# Patient Record
Sex: FEMALE | Race: WHITE | Employment: FULL TIME | URBAN - METROPOLITAN AREA
[De-identification: names, ages, dates, MRNs, and addresses within clinical notes are randomized per-mention and may not be internally consistent; named-entity substitution may affect disease eponyms.]

---

## 2017-01-04 ENCOUNTER — TELEPHONE (OUTPATIENT)
Dept: SURGERY | Age: 57
End: 2017-01-04

## 2017-01-04 NOTE — TELEPHONE ENCOUNTER
Patient would like to know if Dr Teri Lizarraga is on board for the dual surgery with Dr Kike Mcfarlane. She has an appointment with Dr Teri Lizarraga tomorrow.

## 2017-01-05 ENCOUNTER — HOSPITAL ENCOUNTER (OUTPATIENT)
Dept: GENERAL RADIOLOGY | Age: 57
Discharge: HOME | End: 2017-01-05
Attending: SURGERY
Payer: COMMERCIAL

## 2017-01-05 ENCOUNTER — HOSPITAL ENCOUNTER (OUTPATIENT)
Dept: PREADMISSION TESTING | Age: 57
Discharge: HOME | End: 2017-01-05
Attending: PLASTIC SURGERY
Payer: COMMERCIAL

## 2017-01-05 VITALS
TEMPERATURE: 97.7 F | DIASTOLIC BLOOD PRESSURE: 87 MMHG | OXYGEN SATURATION: 97 % | RESPIRATION RATE: 20 BRPM | HEIGHT: 66 IN | BODY MASS INDEX: 31.32 KG/M2 | HEART RATE: 78 BPM | SYSTOLIC BLOOD PRESSURE: 156 MMHG | WEIGHT: 194.89 LBS

## 2017-01-05 LAB
ALBUMIN SERPL BCP-MCNC: 3.5 G/DL (ref 3.5–5)
ALBUMIN/GLOB SERPL: 1 {RATIO} (ref 1.1–2.2)
ALP SERPL-CCNC: 80 U/L (ref 45–117)
ALT SERPL-CCNC: 57 U/L (ref 12–78)
ANION GAP BLD CALC-SCNC: 5 MMOL/L (ref 5–15)
APPEARANCE UR: CLEAR
AST SERPL W P-5'-P-CCNC: 27 U/L (ref 15–37)
ATRIAL RATE: 75 BPM
BACTERIA URNS QL MICRO: ABNORMAL /HPF
BILIRUB SERPL-MCNC: 0.5 MG/DL (ref 0.2–1)
BILIRUB UR QL: NEGATIVE
BUN SERPL-MCNC: 12 MG/DL (ref 6–20)
BUN/CREAT SERPL: 13 (ref 12–20)
CALCIUM SERPL-MCNC: 8.2 MG/DL (ref 8.5–10.1)
CALCULATED P AXIS, ECG09: 24 DEGREES
CALCULATED R AXIS, ECG10: 0 DEGREES
CALCULATED T AXIS, ECG11: 19 DEGREES
CHLORIDE SERPL-SCNC: 108 MMOL/L (ref 97–108)
CO2 SERPL-SCNC: 29 MMOL/L (ref 21–32)
COLOR UR: ABNORMAL
CREAT SERPL-MCNC: 0.89 MG/DL (ref 0.55–1.02)
DIAGNOSIS, 93000: NORMAL
EPITH CASTS URNS QL MICRO: ABNORMAL /LPF
ERYTHROCYTE [DISTWIDTH] IN BLOOD BY AUTOMATED COUNT: 13.8 % (ref 11.5–14.5)
GLOBULIN SER CALC-MCNC: 3.6 G/DL (ref 2–4)
GLUCOSE SERPL-MCNC: 92 MG/DL (ref 65–100)
GLUCOSE UR STRIP.AUTO-MCNC: NEGATIVE MG/DL
HCT VFR BLD AUTO: 41.2 % (ref 35–47)
HGB BLD-MCNC: 13.6 G/DL (ref 11.5–16)
HGB UR QL STRIP: ABNORMAL
KETONES UR QL STRIP.AUTO: NEGATIVE MG/DL
LEUKOCYTE ESTERASE UR QL STRIP.AUTO: NEGATIVE
MCH RBC QN AUTO: 28.8 PG (ref 26–34)
MCHC RBC AUTO-ENTMCNC: 33 G/DL (ref 30–36.5)
MCV RBC AUTO: 87.1 FL (ref 80–99)
NITRITE UR QL STRIP.AUTO: NEGATIVE
P-R INTERVAL, ECG05: 160 MS
PH UR STRIP: 6 [PH] (ref 5–8)
PLATELET # BLD AUTO: 257 K/UL (ref 150–400)
POTASSIUM SERPL-SCNC: 3.8 MMOL/L (ref 3.5–5.1)
PROT SERPL-MCNC: 7.1 G/DL (ref 6.4–8.2)
PROT UR STRIP-MCNC: NEGATIVE MG/DL
Q-T INTERVAL, ECG07: 380 MS
QRS DURATION, ECG06: 78 MS
QTC CALCULATION (BEZET), ECG08: 424 MS
RBC # BLD AUTO: 4.73 M/UL (ref 3.8–5.2)
RBC #/AREA URNS HPF: ABNORMAL /HPF (ref 0–5)
SODIUM SERPL-SCNC: 142 MMOL/L (ref 136–145)
SP GR UR REFRACTOMETRY: 1.02 (ref 1–1.03)
UA: UC IF INDICATED,UAUC: ABNORMAL
UROBILINOGEN UR QL STRIP.AUTO: 0.2 EU/DL (ref 0.2–1)
VENTRICULAR RATE, ECG03: 75 BPM
WBC # BLD AUTO: 6.1 K/UL (ref 3.6–11)
WBC URNS QL MICRO: ABNORMAL /HPF (ref 0–4)

## 2017-01-05 PROCEDURE — 71020 XR CHEST PA LAT: CPT

## 2017-01-05 PROCEDURE — 85027 COMPLETE CBC AUTOMATED: CPT | Performed by: SURGERY

## 2017-01-05 PROCEDURE — 80053 COMPREHEN METABOLIC PANEL: CPT | Performed by: SURGERY

## 2017-01-05 PROCEDURE — 36415 COLL VENOUS BLD VENIPUNCTURE: CPT | Performed by: SURGERY

## 2017-01-05 PROCEDURE — 87086 URINE CULTURE/COLONY COUNT: CPT | Performed by: SURGERY

## 2017-01-05 PROCEDURE — 81001 URINALYSIS AUTO W/SCOPE: CPT | Performed by: SURGERY

## 2017-01-05 PROCEDURE — 93005 ELECTROCARDIOGRAM TRACING: CPT

## 2017-01-05 RX ORDER — SODIUM CHLORIDE, SODIUM LACTATE, POTASSIUM CHLORIDE, CALCIUM CHLORIDE 600; 310; 30; 20 MG/100ML; MG/100ML; MG/100ML; MG/100ML
25 INJECTION, SOLUTION INTRAVENOUS CONTINUOUS
Status: CANCELLED | OUTPATIENT
Start: 2017-01-13

## 2017-01-05 RX ORDER — CEFAZOLIN SODIUM IN 0.9 % NACL 2 G/100 ML
2 PLASTIC BAG, INJECTION (ML) INTRAVENOUS ONCE
Status: CANCELLED | OUTPATIENT
Start: 2017-01-13 | End: 2017-01-13

## 2017-01-05 NOTE — PERIOP NOTES
[facility]  Preoperative Instructions         Surgery Date 01/13/17          Time of Arrival 0630  Contact # [phone] cell    1. On the day of your surgery, please report to the Surgical Services Registration Desk and sign in at your designated time. The Surgery Center is located to the right of the Emergency Room. 2. You must have someone with you to drive you home. You should not drive a car for 24 hours following surgery. Please make arrangements for a friend or family member to stay with you for the first 24 hours after your surgery. 3. Do not have anything to eat or drink (including water, gum, mints, coffee, juice) after midnight          . This may not apply to medications prescribed by your physician. Please note special instructions, if applicable. If you are currently taking Plavix, Coumadin, or other blood-thinning agents, contact your surgeon for instructions. 4. We recommend you do not drink any alcoholic beverages for 24 hours before and after your surgery. 5. Have a list of all current medications, including vitamins, herbal supplements and any other over the counter medications. Stop all Aspirin and non-steroidal anti-inflammatory drugs (I.e. Advil, Aleve), as directed by your surgeon's office. Stop all vitamins and herbal supplements seven days prior to your surgery. 6. Wear comfortable clothes. Wear glasses instead of contacts. Do not bring any money or jewelry. Please bring picture ID, insurance card, and any prearranged co-payment or hospital payment. Do not wear make-up, particularly mascara the morning of your surgery. Do not wear nail polish, particularly if you are having foot /hand surgery. Wear your hair loose or down, no ponytails, buns, juliet pins or clips. All body piercings must be removed.   Please shower with antibacterial soap for three consecutive days before and on the morning of surgery, but do not apply any lotions, powders or deodorants after the shower on the day of surgery. Please use a fresh towels after each shower. Please sleep in clean clothes and change bed linens the night before surgery. Please do not shave for 48 hours prior to surgery. Shaving of the face is acceptable. 7. You should understand that if you do not follow these instructions your surgery may be cancelled. If your physical condition changes (I.e. fever, cold or flu) please contact your surgeon as soon as possible. 8. It is important that you be on time. If a situation occurs where you may be late, please call [phone] (OR Holding Area). 9. If you have any questions and or problems, please call [phone] (Pre-admission Testing). 10. Your surgery time may be subject to change. You will receive a phone call the evening prior if your time changes. 11.  If having outpatient surgery, you must have someone to drive you here, stay with you during the duration of your stay, and to drive you home at time of discharge. Special Instructions:    MEDICATIONS TO TAKE THE MORNING OF SURGERY WITH A SIP OF WATER: none      I understand a pre-operative phone call will be made to verify my surgery time. In the event that I am not available, I give permission for a message to be left on my answering service and/or with another person?   yes         ___________________      __________   _________    (Signature of Patient)             (Witness)                (Date and Time)

## 2017-01-06 ENCOUNTER — OFFICE VISIT (OUTPATIENT)
Dept: SURGERY | Age: 57
End: 2017-01-06

## 2017-01-06 VITALS
SYSTOLIC BLOOD PRESSURE: 160 MMHG | RESPIRATION RATE: 20 BRPM | OXYGEN SATURATION: 98 % | HEART RATE: 79 BPM | DIASTOLIC BLOOD PRESSURE: 94 MMHG | WEIGHT: 194 LBS | HEIGHT: 66 IN | BODY MASS INDEX: 31.18 KG/M2

## 2017-01-06 DIAGNOSIS — C50.411 BREAST CANCER OF UPPER-OUTER QUADRANT OF RIGHT FEMALE BREAST (HCC): Primary | ICD-10-CM

## 2017-01-06 LAB
BACTERIA SPEC CULT: NORMAL
CC UR VC: NORMAL
SERVICE CMNT-IMP: NORMAL

## 2017-01-06 NOTE — PROGRESS NOTES
HISTORY OF PRESENT ILLNESS  Bossman Evans is a 60 y.o. female who comes in for revaluation for a new breast cancer  Breast Cancer   Pertinent negatives include no chest pain, no abdominal pain, no headaches and no shortness of breath. She went for screening mammogram and was noted to have a right breast calcificaitons at 1200. She had a dx mammogram and US demonstrating a 6 mm lesion but also surrounding microcalcifications 2 cm away. US core of the lesion 11/15/2016 demonstrated IDC G2 % WI 15% her2/susy 0, and Ki67 30%. She previously had had a right breast biopsy for \"precancerous cells\" in 2003. She denies feeling any lumps, skin changes, nipple changes or drainage, unexplained weight loss or bone pain. She had menarche at 4, first of two pregnancies at 27, menopause at 44 and did not take HRT. She reports M with breast ca at 68, MGA with breast ca in 62s. She underwent breast MRI demonstrating more extensive disease and she has decided to proceed with mastectomy and reconstruction. Past Medical History   Diagnosis Date    Cancer [facility]) 2016     breast    Ill-defined condition      bronchitis hx     Past Surgical History   Procedure Laterality Date    Hx dilation and curettage      Pr breast surgery procedure unlisted       right breast biopsy benign    Hx lap cholecystectomy      Hx orthopaedic  5/2/12     open reduction and internal fixation right distal radius     Family History   Problem Relation Age of Onset    Hypertension Mother     Cancer Mother      breast bladder    Cancer Father      liver     Social History   Substance Use Topics    Smoking status: Never Smoker    Smokeless tobacco: Never Used    Alcohol use 0.6 oz/week     1 Shots of liquor per week      Comment: very rare--once or twice a year     No current outpatient prescriptions on file. No current facility-administered medications for this visit.       No Known Allergies    Review of Systems Constitutional: Negative for chills, diaphoresis, fever, malaise/fatigue and weight loss. HENT: Negative for congestion, ear pain and sore throat. Eyes: Negative for blurred vision and pain. Respiratory: Negative for cough, hemoptysis, sputum production, shortness of breath, wheezing and stridor. Cardiovascular: Negative for chest pain, palpitations, orthopnea, claudication, leg swelling and PND. Gastrointestinal: Negative for abdominal pain, blood in stool, constipation, diarrhea, heartburn, melena, nausea and vomiting. Genitourinary: Negative for dysuria, flank pain, frequency, hematuria and urgency. Musculoskeletal: Negative for back pain, joint pain, myalgias and neck pain. Skin: Negative for itching and rash. Neurological: Negative for dizziness, tremors, focal weakness, seizures, weakness and headaches. Endo/Heme/Allergies: Negative for polydipsia. Psychiatric/Behavioral: Negative for depression and memory loss. The patient is not nervous/anxious. There were no vitals taken for this visit. Physical Exam   Constitutional: She is oriented to person, place, and time. She appears well-developed and well-nourished. No distress. HENT:   Head: Normocephalic and atraumatic. Mouth/Throat: Oropharynx is clear and moist. No oropharyngeal exudate. Eyes: Conjunctivae and EOM are normal. Pupils are equal, round, and reactive to light. No scleral icterus. Neck: Normal range of motion. Neck supple. No JVD present. No tracheal deviation present. No thyromegaly present. Cardiovascular: Normal rate and regular rhythm. Exam reveals no gallop and no friction rub. No murmur heard. Pulmonary/Chest: Effort normal and breath sounds normal. No respiratory distress. She has no wheezes. She has no rales. Right breast exhibits skin change and tenderness (slight bruising and tenderness). Right breast exhibits no inverted nipple, no mass and no nipple discharge.  Left breast exhibits no inverted nipple, no mass, no nipple discharge, no skin change and no tenderness. Breasts are symmetrical (med/large somewhat ptotic). Abdominal: Soft. Bowel sounds are normal. She exhibits no distension and no mass. There is no tenderness. There is no rebound and no guarding. Musculoskeletal: Normal range of motion. She exhibits no edema. Lymphadenopathy:     She has no cervical adenopathy. She has no axillary adenopathy. Right: No supraclavicular adenopathy present. Left: No supraclavicular adenopathy present. Neurological: She is alert and oriented to person, place, and time. No cranial nerve deficit. Skin: Skin is warm and dry. No rash noted. She is not diaphoretic. No erythema. No pallor. Psychiatric: She has a normal mood and affect. Her behavior is normal. Judgment and thought content normal.     I reviewed her mammograms/US from [address] [place] [address] and PLAN  1. Newly diagnosed right breast cancer early stage, Clinically stage 1 ([id number]). I spent over 60 minutes discussing the anatomy and pathophysiology of breast cancer and treatment options, prognosis. We discussed breast conservation therapy vs mastectomy with and without reconstruction, sentinel lymph node biopsy and axillary dissection, various forms of radiation (whole vs accelerated partial breast), medical oncology therapy (SERM vs Aromatase inhibitors, chemotherapy, herceptin). I discussed about BRCA genetic testing and oncotype DX gene mapping of the tumor. I discussed the risks and benefits of surgery including bleeding, infection, paresthesias and numbness, cosmetic changes, lymphedema, seroma, chronic pain, and need for reoperation for positive margins/sentinel nodes. I discussed websites for her to review.     She desires a right skin/nipple sparing mastectomy with sentinel lymph node biopsy/possible axillary node dissection and reconstruction by DR Daly Bryant under general anesthesia as an outpatient with an overnight stay.     Britney Rogers MD FACS

## 2017-01-06 NOTE — MR AVS SNAPSHOT
Visit Information Date & Time Provider Department Dept. Phone Encounter #  
 1/6/2017 10:40 AM Salazar Nathan MD [facility] [id number] Upcoming Health Maintenance Date Due Hepatitis C Screening 1960 Pneumococcal 19-64 Highest Risk (1 of 3 - PCV13) 10/24/1977 DTaP/Tdap/Td series (1 - Tdap) 10/24/1979 PAP AKA CERVICAL CYTOLOGY 10/24/1979 BREAST CANCER SCRN MAMMOGRAM 10/24/2008 FOBT Q 1 YEAR AGE 50-75 10/24/2008 INFLUENZA AGE 9 TO ADULT 8/1/2016 Allergies as of 1/6/2017  Review Complete On: 1/6/2017 By: Marichuy Prakash No Known Allergies Current Immunizations  Never Reviewed No immunizations on file. Not reviewed this visit Vitals BP Pulse Resp Height(growth percentile) Weight(growth percentile) SpO2  
 (!) 160/94 (BP 1 Location: Right arm, BP Patient Position: Sitting) 79 20 5' 6\" (1.676 m) 194 lb (88 kg) 98% BMI OB Status Smoking Status 31.31 kg/m2 Postmenopausal Never Smoker BMI and BSA Data Body Mass Index Body Surface Area  
 31.31 kg/m 2 2.02 m 2 Your Updated Medication List  
  
Notice  As of 1/6/2017 11:16 AM  
 You have not been prescribed any medications. To-Do List   
 01/13/2017 10:00 AM  
  Appointment with [address] [place] [address] 3 at [facility] ([phone]) Please bring any recent X-rays with you to the procedure. You must bring a LIST or BAG of all current medication you are taking with you to your appointment. Introducing [facility] & HEALTH SERVICES! Mikayla Hayes introduces Monitor Backlinks patient portal. Now you can access parts of your medical record, email your doctor's office, and request medication refills online. 1. In your internet browser, go to [email or web address] 2. Click on the First Time User? Click Here link in the Sign In box. You will see the New Member Sign Up page. 3. Enter your Sosedi Access Code exactly as it appears below. You will not need to use this code after youve completed the sign-up process. If you do not sign up before the expiration date, you must request a new code. · Sosedi Access Code: [email or web address] Expires: 3/6/2017  9:39 AM 
 
4. Enter the last four digits of your Social Security Number (xxxx) and Date of Birth (mm/dd/yyyy) as indicated and click Submit. You will be taken to the next sign-up page. 5. Create a Sosedi ID. This will be your Sosedi login ID and cannot be changed, so think of one that is secure and easy to remember. 6. Create a Sosedi password. You can change your password at any time. 7. Enter your Password Reset Question and Answer. This can be used at a later time if you forget your password. 8. Enter your e-mail address. You will receive e-mail notification when new information is available in [address]. 9. Click Sign Up. You can now view and download portions of your medical record. 10. Click the Download Summary menu link to download a portable copy of your medical information. If you have questions, please visit the Frequently Asked Questions section of the Sosedi website. Remember, Sosedi is NOT to be used for urgent needs. For medical emergencies, dial 911. Now available from your iPhone and Android! Please provide this summary of care documentation to your next provider. Your primary care clinician is listed as [facility]. If you have any questions after today's visit, please call [phone].

## 2017-01-06 NOTE — PERIOP NOTES
Reviewed pre-op EKG (and comparison EKG 2012) with Dr. Sharleen Babinski. Clearance needed. Faxed information to Dr. Sahra Pat faxed final urine culture result. Confirmation.

## 2017-01-09 ENCOUNTER — TELEPHONE (OUTPATIENT)
Dept: SURGERY | Age: 57
End: 2017-01-09

## 2017-01-09 NOTE — TELEPHONE ENCOUNTER
Abnormal EKG, per anesthesia needs cardiac clearance. Spoke to pt, appointment scheduled with Dr. Niraj Dailey 1/10/17.

## 2017-01-10 ENCOUNTER — OFFICE VISIT (OUTPATIENT)
Dept: CARDIOLOGY CLINIC | Age: 57
End: 2017-01-10

## 2017-01-10 VITALS
SYSTOLIC BLOOD PRESSURE: 140 MMHG | RESPIRATION RATE: 16 BRPM | HEART RATE: 76 BPM | DIASTOLIC BLOOD PRESSURE: 80 MMHG | BODY MASS INDEX: 31.02 KG/M2 | WEIGHT: 193 LBS | HEIGHT: 66 IN | OXYGEN SATURATION: 98 %

## 2017-01-10 DIAGNOSIS — R94.31 ABNORMAL EKG: Primary | ICD-10-CM

## 2017-01-10 NOTE — PROGRESS NOTES
[address] [place] [address] 60, [place], [address]     Mark Solomon is a 60 y.o. female. Referred for evaluation of abnormal EKG by Dr. Lennie Villa. Subjective:     She plans to undergo breast surgery in 4 days for breast cancer of upper-outer quadrant of right breast. She is referred here for abnormal EKG. She denies any family or personal history of cardiac disease. She remains active with light to moderate exercise with no exertional symptoms. Her blood pressure is under good control. Patient denies any exertional chest pain, dyspnea, palpitations, syncope, orthopnea, edema or paroxysmal nocturnal dyspnea. There is no FH of CAD. Patient Active Problem List    Diagnosis Date Noted    Abnormal EKG 01/10/2017    Breast cancer of upper-outer quadrant of right female breast ([facility] [address] 12/06/2016    Wrist fracture, right 04/22/2012    Ankle fracture, right 04/22/2012      Nikky Ayers MD  Past Medical History   Diagnosis Date    Cancer [facility]) 2016     breast    Ill-defined condition      bronchitis hx      Past Surgical History   Procedure Laterality Date    Hx dilation and curettage      Pr breast surgery procedure unlisted       right breast biopsy benign    Hx lap cholecystectomy      Hx orthopaedic  5/2/12     open reduction and internal fixation right distal radius     No Known Allergies   Family History   Problem Relation Age of Onset    Hypertension Mother     Cancer Mother      breast bladder    Cancer Father      liver      Social History     Social History    Marital status:      Spouse name: N/A    Number of children: N/A    Years of education: N/A     Occupational History    Not on file.      Social History Main Topics    Smoking status: Never Smoker    Smokeless tobacco: Never Used    Alcohol use 0.6 oz/week     1 Shots of liquor per week      Comment: very rare--once or twice a year    Drug use: No    Sexual activity: Not on file     Other Topics Concern    Not on file     Social History Narrative      No current outpatient prescriptions on file. No current facility-administered medications for this visit. Review of Systems  Constitutional: Negative for fever, chills, malaise/fatigue and diaphoresis. Respiratory: Negative for cough, hemoptysis, sputum production, shortness of breath and wheezing. Cardiovascular: Negative for chest pain, palpitations, orthopnea, claudication, leg swelling and PND. Gastrointestinal: Negative for heartburn, nausea, vomiting, blood in stool and melena. Genitourinary: Negative for dysuria and flank pain. Musculoskeletal: Negative for joint pain and back pain. Skin: Negative for rash. Neurological: Negative for focal weakness, seizures, loss of consciousness, weakness and headaches. Endo/Heme/Allergies: Does not bruise/bleed easily. Psychiatric/Behavioral: Negative for memory loss. The patient does not have insomnia. Physical Exam:    Visit Vitals    /80  Comment: lt/rt/lg    Pulse 76    Resp 16    Ht 5' 6\" (1.676 m)    Wt 193 lb (87.5 kg)    SpO2 98%    BMI 31.15 kg/m2     Wt Readings from Last 3 Encounters:   01/10/17 193 lb (87.5 kg)   01/06/17 194 lb (88 kg)   01/05/17 194 lb 14.2 oz (88.4 kg)       Gen: NAD    Mental Status - Alert. General Appearance - Not in acute distress. Neck - no JVD    Chest and Lung Exam   Inspection: Accessory muscles - No use of accessory muscles in breathing. Auscultation:   Breath sounds: - Normal.     Cardiovascular   Inspection: Jugular vein - Bilateral - Inspection Normal.   Palpation/Percussion:   Apical Impulse: - Normal.   Auscultation: Rhythm - Regular. Heart Sounds - S1 WNL and S2 WNL. No S3 or S4. Murmurs & Other Heart Sounds: Auscultation of the heart reveals - No Murmurs. Peripheral Vascular   Upper Extremity: Inspection - Bilateral - No Cyanotic nailbeds or Digital clubbing.    Lower Extremity:   Palpation: Edema - Bilateral - No edema. Abdomen: Soft, non-tender, bowel sounds are active. Neuro: A&O times 3, CN and motor grossly WNL    Cardiographics  EKG - possible inferior MI      Assessment:     Encounter Diagnosis   Name Primary?  Abnormal EKG Yes      Plan:     Previous EKG borderline for old inf MI and today's EKG doesn't show it at all. Asymptomatic and active with no cardiac risk factors. I do not think she has had a prior MI. She is cleared at low cardiac risk. Follow up as needed.      Written by Nay Salinas, as dictated by Brook Santana MD.

## 2017-01-10 NOTE — TELEPHONE ENCOUNTER
Spoke to pt, cardiac clearance received. Spoke to [address] to inform.

## 2017-01-10 NOTE — MR AVS SNAPSHOT
Visit Information Date & Time Provider Department Dept. Phone Encounter #  
 1/10/2017 10:15 AM Triny Salazar, [facility] Associate [place] [phone] [id number] Your Appointments 1/20/2017  9:20 AM  
POST OP with Duyen Butler MD  
[facility] ([address]) Appt Note: po mastectomy 1/13/2017  
 [address], [address], [address] [phone]  
[address], [address], [address] [facility] [address] [phone] Upcoming Health Maintenance Date Due Hepatitis C Screening 1960 Pneumococcal 19-64 Highest Risk (1 of 3 - PCV13) 10/24/1977 DTaP/Tdap/Td series (1 - Tdap) 10/24/1979 PAP AKA CERVICAL CYTOLOGY 10/24/1979 BREAST CANCER SCRN MAMMOGRAM 10/24/2008 FOBT Q 1 YEAR AGE 50-75 10/24/2008 INFLUENZA AGE 9 TO ADULT 8/1/2016 Allergies as of 1/10/2017  Review Complete On: 1/10/2017 By: Ellery Favre, LPN No Known Allergies Current Immunizations  Never Reviewed No immunizations on file. Not reviewed this visit You Were Diagnosed With   
  
 Codes Comments Abnormal EKG    -  Primary ICD-10-CM: R94.31 
ICD-9-CM: 794.31 Vitals BP Pulse Resp Height(growth percentile) Weight(growth percentile) SpO2  
 140/80 76 16 5' 6\" (1.676 m) 193 lb (87.5 kg) 98% BMI OB Status Smoking Status 31.15 kg/m2 Postmenopausal Never Smoker BMI and BSA Data Body Mass Index Body Surface Area  
 31.15 kg/m 2 2.02 m 2 Your Updated Medication List  
  
Notice  As of 1/10/2017 10:55 AM  
 You have not been prescribed any medications. We Performed the Following AMB POC EKG ROUTINE W/ 12 LEADS, INTER & REP [62925 CPT(R)] To-Do List   
 01/11/2017 8:00 AM  
  Appointment with [facility] PAT ROOM P1 at [facility] OR [facility]READMIT TESTING ([phone])  
  
 01/13/2017 10:00 AM  
 Appointment with [address] 3 at [facility] ([phone]) Please bring any recent X-rays with you to the procedure. You must bring a LIST or BAG of all current medication you are taking with you to your appointment. Introducing [facility] & HEALTH SERVICES! Debbie Morgan introduces Private Practice patient portal. Now you can access parts of your medical record, email your doctor's office, and request medication refills online. 1. In your internet browser, go to [email or web address] 2. Click on the First Time User? Click Here link in the Sign In box. You will see the New Member Sign Up page. 3. Enter your Private Practice Access Code exactly as it appears below. You will not need to use this code after youve completed the sign-up process. If you do not sign up before the expiration date, you must request a new code. · Private Practice Access Code: [email or web address] Expires: 3/6/2017  9:39 AM 
 
4. Enter the last four digits of your Social Security Number (xxxx) and Date of Birth (mm/dd/yyyy) as indicated and click Submit. You will be taken to the next sign-up page. 5. Create a Private Practice ID. This will be your Private Practice login ID and cannot be changed, so think of one that is secure and easy to remember. 6. Create a Private Practice password. You can change your password at any time. 7. Enter your Password Reset Question and Answer. This can be used at a later time if you forget your password. 8. Enter your e-mail address. You will receive e-mail notification when new information is available in [address] 9. Click Sign Up. You can now view and download portions of your medical record. 10. Click the Download Summary menu link to download a portable copy of your medical information. If you have questions, please visit the Frequently Asked Questions section of the Private Practice website. Remember, Private Practice is NOT to be used for urgent needs. For medical emergencies, dial 911. Now available from your iPhone and Android! Please provide this summary of care documentation to your next provider. Your primary care clinician is listed as [address] [facility]. If you have any questions after today's visit, please call [phone].

## 2017-01-10 NOTE — PROGRESS NOTES
Patient has no cardiac complaints she is requiring surgical clearance ( breast) had an abnormal EKG.

## 2017-01-11 ENCOUNTER — HOSPITAL ENCOUNTER (OUTPATIENT)
Dept: PREADMISSION TESTING | Age: 57
Discharge: HOME | End: 2017-01-11
Attending: SURGERY
Payer: COMMERCIAL

## 2017-01-11 LAB
APPEARANCE UR: ABNORMAL
BACTERIA URNS QL MICRO: ABNORMAL /HPF
BILIRUB UR QL: NEGATIVE
COLOR UR: ABNORMAL
EPITH CASTS URNS QL MICRO: ABNORMAL /LPF
GLUCOSE UR STRIP.AUTO-MCNC: NEGATIVE MG/DL
HGB UR QL STRIP: ABNORMAL
KETONES UR QL STRIP.AUTO: NEGATIVE MG/DL
LEUKOCYTE ESTERASE UR QL STRIP.AUTO: ABNORMAL
MUCOUS THREADS URNS QL MICRO: ABNORMAL /LPF
NITRITE UR QL STRIP.AUTO: NEGATIVE
PH UR STRIP: 5.5 [PH] (ref 5–8)
PROT UR STRIP-MCNC: NEGATIVE MG/DL
RBC #/AREA URNS HPF: ABNORMAL /HPF (ref 0–5)
SP GR UR REFRACTOMETRY: 1.02 (ref 1–1.03)
UA: UC IF INDICATED,UAUC: ABNORMAL
UROBILINOGEN UR QL STRIP.AUTO: 0.2 EU/DL (ref 0.2–1)
WBC URNS QL MICRO: ABNORMAL /HPF (ref 0–4)

## 2017-01-12 ENCOUNTER — DOCUMENTATION ONLY (OUTPATIENT)
Dept: SURGERY | Age: 57
End: 2017-01-12

## 2017-01-12 ENCOUNTER — APPOINTMENT (OUTPATIENT)
Dept: MAMMOGRAPHY | Age: 57
End: 2017-01-12
Attending: SURGERY
Payer: COMMERCIAL

## 2017-01-12 ENCOUNTER — APPOINTMENT (OUTPATIENT)
Dept: NUCLEAR MEDICINE | Age: 57
End: 2017-01-12
Attending: SURGERY
Payer: COMMERCIAL

## 2017-01-12 LAB
BACTERIA SPEC CULT: NORMAL
CC UR VC: NORMAL
SERVICE CMNT-IMP: NORMAL

## 2017-01-13 ENCOUNTER — ANESTHESIA (OUTPATIENT)
Dept: SURGERY | Age: 57
End: 2017-01-13
Payer: COMMERCIAL

## 2017-01-13 ENCOUNTER — ANESTHESIA EVENT (OUTPATIENT)
Dept: SURGERY | Age: 57
End: 2017-01-13
Payer: COMMERCIAL

## 2017-01-13 ENCOUNTER — APPOINTMENT (OUTPATIENT)
Dept: NUCLEAR MEDICINE | Age: 57
End: 2017-01-13
Attending: SURGERY
Payer: COMMERCIAL

## 2017-01-13 PROCEDURE — 77030019908 HC STETH ESOPH SIMS -A: Performed by: ANESTHESIOLOGY

## 2017-01-13 PROCEDURE — 74011250636 HC RX REV CODE- 250/636: Performed by: PLASTIC SURGERY

## 2017-01-13 PROCEDURE — A9541 TC99M SULFUR COLLOID: HCPCS

## 2017-01-13 PROCEDURE — 77030008684 HC TU ET CUF COVD -B: Performed by: ANESTHESIOLOGY

## 2017-01-13 PROCEDURE — 74011250636 HC RX REV CODE- 250/636: Performed by: ANESTHESIOLOGY

## 2017-01-13 PROCEDURE — 74011250636 HC RX REV CODE- 250/636

## 2017-01-13 PROCEDURE — 77030026438 HC STYL ET INTUB CARD -A: Performed by: ANESTHESIOLOGY

## 2017-01-13 PROCEDURE — 74011000250 HC RX REV CODE- 250

## 2017-01-13 PROCEDURE — 77030020061 HC IV BLD WRMR ADMIN SET 3M -B: Performed by: ANESTHESIOLOGY

## 2017-01-13 PROCEDURE — 77030013079 HC BLNKT BAIR HGGR 3M -A: Performed by: ANESTHESIOLOGY

## 2017-01-13 RX ORDER — ONDANSETRON 2 MG/ML
INJECTION INTRAMUSCULAR; INTRAVENOUS AS NEEDED
Status: DISCONTINUED | OUTPATIENT
Start: 2017-01-13 | End: 2017-01-13 | Stop reason: HOSPADM

## 2017-01-13 RX ORDER — GLYCOPYRROLATE 0.2 MG/ML
INJECTION INTRAMUSCULAR; INTRAVENOUS AS NEEDED
Status: DISCONTINUED | OUTPATIENT
Start: 2017-01-13 | End: 2017-01-13 | Stop reason: HOSPADM

## 2017-01-13 RX ORDER — SUCCINYLCHOLINE CHLORIDE 20 MG/ML
INJECTION INTRAMUSCULAR; INTRAVENOUS AS NEEDED
Status: DISCONTINUED | OUTPATIENT
Start: 2017-01-13 | End: 2017-01-13 | Stop reason: HOSPADM

## 2017-01-13 RX ORDER — ROCURONIUM BROMIDE 10 MG/ML
INJECTION, SOLUTION INTRAVENOUS AS NEEDED
Status: DISCONTINUED | OUTPATIENT
Start: 2017-01-13 | End: 2017-01-13 | Stop reason: HOSPADM

## 2017-01-13 RX ORDER — MIDAZOLAM HYDROCHLORIDE 1 MG/ML
INJECTION, SOLUTION INTRAMUSCULAR; INTRAVENOUS AS NEEDED
Status: DISCONTINUED | OUTPATIENT
Start: 2017-01-13 | End: 2017-01-13 | Stop reason: HOSPADM

## 2017-01-13 RX ORDER — DEXAMETHASONE SODIUM PHOSPHATE 4 MG/ML
INJECTION, SOLUTION INTRA-ARTICULAR; INTRALESIONAL; INTRAMUSCULAR; INTRAVENOUS; SOFT TISSUE AS NEEDED
Status: DISCONTINUED | OUTPATIENT
Start: 2017-01-13 | End: 2017-01-13 | Stop reason: HOSPADM

## 2017-01-13 RX ORDER — FENTANYL CITRATE 50 UG/ML
INJECTION, SOLUTION INTRAMUSCULAR; INTRAVENOUS AS NEEDED
Status: DISCONTINUED | OUTPATIENT
Start: 2017-01-13 | End: 2017-01-13 | Stop reason: HOSPADM

## 2017-01-13 RX ORDER — NEOSTIGMINE METHYLSULFATE 1 MG/ML
INJECTION INTRAVENOUS AS NEEDED
Status: DISCONTINUED | OUTPATIENT
Start: 2017-01-13 | End: 2017-01-13 | Stop reason: HOSPADM

## 2017-01-13 RX ORDER — HYDROMORPHONE HYDROCHLORIDE 2 MG/ML
INJECTION, SOLUTION INTRAMUSCULAR; INTRAVENOUS; SUBCUTANEOUS AS NEEDED
Status: DISCONTINUED | OUTPATIENT
Start: 2017-01-13 | End: 2017-01-13 | Stop reason: HOSPADM

## 2017-01-13 RX ORDER — LIDOCAINE HYDROCHLORIDE 20 MG/ML
INJECTION, SOLUTION EPIDURAL; INFILTRATION; INTRACAUDAL; PERINEURAL AS NEEDED
Status: DISCONTINUED | OUTPATIENT
Start: 2017-01-13 | End: 2017-01-13 | Stop reason: HOSPADM

## 2017-01-13 RX ORDER — PROPOFOL 10 MG/ML
INJECTION, EMULSION INTRAVENOUS AS NEEDED
Status: DISCONTINUED | OUTPATIENT
Start: 2017-01-13 | End: 2017-01-13 | Stop reason: HOSPADM

## 2017-01-13 RX ADMIN — HYDROMORPHONE HYDROCHLORIDE 0.25 MG: 2 INJECTION, SOLUTION INTRAMUSCULAR; INTRAVENOUS; SUBCUTANEOUS at 14:03

## 2017-01-13 RX ADMIN — SODIUM CHLORIDE, POTASSIUM CHLORIDE, SODIUM LACTATE AND CALCIUM CHLORIDE: 600; 310; 30; 20 INJECTION, SOLUTION INTRAVENOUS at 13:15

## 2017-01-13 RX ADMIN — CEFAZOLIN 2 G: 10 INJECTION, POWDER, FOR SOLUTION INTRAVENOUS; PARENTERAL at 12:54

## 2017-01-13 RX ADMIN — HYDROMORPHONE HYDROCHLORIDE 0.25 MG: 2 INJECTION, SOLUTION INTRAMUSCULAR; INTRAVENOUS; SUBCUTANEOUS at 14:34

## 2017-01-13 RX ADMIN — SODIUM CHLORIDE, POTASSIUM CHLORIDE, SODIUM LACTATE AND CALCIUM CHLORIDE: 600; 310; 30; 20 INJECTION, SOLUTION INTRAVENOUS at 12:00

## 2017-01-13 RX ADMIN — ONDANSETRON 4 MG: 2 INJECTION INTRAMUSCULAR; INTRAVENOUS at 15:00

## 2017-01-13 RX ADMIN — FENTANYL CITRATE 100 MCG: 50 INJECTION, SOLUTION INTRAMUSCULAR; INTRAVENOUS at 13:11

## 2017-01-13 RX ADMIN — ROCURONIUM BROMIDE 20 MG: 10 INJECTION, SOLUTION INTRAVENOUS at 14:04

## 2017-01-13 RX ADMIN — FENTANYL CITRATE 50 MCG: 50 INJECTION, SOLUTION INTRAMUSCULAR; INTRAVENOUS at 13:08

## 2017-01-13 RX ADMIN — NEOSTIGMINE METHYLSULFATE 3 MG: 1 INJECTION INTRAVENOUS at 15:48

## 2017-01-13 RX ADMIN — LIDOCAINE HYDROCHLORIDE 100 MG: 20 INJECTION, SOLUTION EPIDURAL; INFILTRATION; INTRACAUDAL; PERINEURAL at 12:45

## 2017-01-13 RX ADMIN — MIDAZOLAM HYDROCHLORIDE 2 MG: 1 INJECTION, SOLUTION INTRAMUSCULAR; INTRAVENOUS at 12:38

## 2017-01-13 RX ADMIN — DEXAMETHASONE SODIUM PHOSPHATE 10 MG: 4 INJECTION, SOLUTION INTRA-ARTICULAR; INTRALESIONAL; INTRAMUSCULAR; INTRAVENOUS; SOFT TISSUE at 13:10

## 2017-01-13 RX ADMIN — ROCURONIUM BROMIDE 40 MG: 10 INJECTION, SOLUTION INTRAVENOUS at 13:10

## 2017-01-13 RX ADMIN — SUCCINYLCHOLINE CHLORIDE 140 MG: 20 INJECTION INTRAMUSCULAR; INTRAVENOUS at 12:45

## 2017-01-13 RX ADMIN — HYDROMORPHONE HYDROCHLORIDE 0.5 MG: 2 INJECTION, SOLUTION INTRAMUSCULAR; INTRAVENOUS; SUBCUTANEOUS at 14:51

## 2017-01-13 RX ADMIN — GLYCOPYRROLATE 0.4 MG: 0.2 INJECTION INTRAMUSCULAR; INTRAVENOUS at 15:48

## 2017-01-13 RX ADMIN — FENTANYL CITRATE 100 MCG: 50 INJECTION, SOLUTION INTRAMUSCULAR; INTRAVENOUS at 12:45

## 2017-01-13 RX ADMIN — ROCURONIUM BROMIDE 10 MG: 10 INJECTION, SOLUTION INTRAVENOUS at 12:45

## 2017-01-13 RX ADMIN — PROPOFOL 100 MG: 10 INJECTION, EMULSION INTRAVENOUS at 12:45

## 2017-01-13 NOTE — ANESTHESIA POSTPROCEDURE EVALUATION
Post-Anesthesia Evaluation and Assessment    Patient: Bossman Evans MRN: [id number]  SSN: [id number]    YOB: 1960  Age: 60 y.o. Sex: female       Cardiovascular Function/Vital Signs  Visit Vitals    /75    Pulse 64    Temp 37 °C (98.6 °F)    Resp 12    Wt 88.3 kg (194 lb 10.7 oz)    SpO2 96%    BMI 31.42 kg/m2       Patient is status post general anesthesia for Procedure(s):  RIGHT BREAST SKIN SPARING MASTECTOMY AND SENTINEL LYMPH NODE BIOPSY/AXILLARY NODE DISSECTION  RIGHT BREAST RECONSTRUCTION WITH TISSUE EXPANDER AND ALLODERM. Nausea/Vomiting: None    Postoperative hydration reviewed and adequate. Pain:  Pain Scale 1: FLACC (01/13/17 1700)  Pain Intensity 1: 0 (01/13/17 1700)   Managed    Neurological Status:   Neuro (WDL): (P) Exceptions to WDL (01/13/17 1606)   At baseline    Mental Status and Level of Consciousness: Arousable    Pulmonary Status:   O2 Device: Nasal cannula (01/13/17 1700)   Adequate oxygenation and airway patent    Complications related to anesthesia: None    Post-anesthesia assessment completed.  No concerns    Signed By: Daina Ding MD     January 13, 2017

## 2017-01-13 NOTE — ANESTHESIA PREPROCEDURE EVALUATION
Anesthetic History   No history of anesthetic complications            Review of Systems / Medical History  Patient summary reviewed, nursing notes reviewed and pertinent labs reviewed    Pulmonary  Within defined limits                 Neuro/Psych   Within defined limits           Cardiovascular  Within defined limits                Exercise tolerance: >4 METS     GI/Hepatic/Renal  Within defined limits              Endo/Other  Within defined limits      Cancer     Other Findings              Physical Exam    Airway  Mallampati: II  TM Distance: 4 - 6 cm  Neck ROM: normal range of motion   Mouth opening: Normal     Cardiovascular  Regular rate and rhythm,  S1 and S2 normal,  no murmur, click, rub, or gallop             Dental  No notable dental hx       Pulmonary  Breath sounds clear to auscultation               Abdominal  GI exam deferred       Other Findings            Anesthetic Plan    ASA: 2  Anesthesia type: general          Induction: Intravenous  Anesthetic plan and risks discussed with: Patient

## 2017-01-20 ENCOUNTER — OFFICE VISIT (OUTPATIENT)
Dept: SURGERY | Age: 57
End: 2017-01-20

## 2017-01-20 VITALS
RESPIRATION RATE: 18 BRPM | HEART RATE: 75 BPM | DIASTOLIC BLOOD PRESSURE: 84 MMHG | WEIGHT: 196 LBS | SYSTOLIC BLOOD PRESSURE: 151 MMHG | BODY MASS INDEX: 31.5 KG/M2 | HEIGHT: 66 IN | OXYGEN SATURATION: 97 %

## 2017-01-20 DIAGNOSIS — Z09 POSTOPERATIVE EXAMINATION: Primary | ICD-10-CM

## 2017-01-20 NOTE — LETTER
NOTIFICATION OF RETURN TO WORK 
 
1/20/2017 9:54 AM 
 
Ms. Preeti Ashley [address] [facility] [address] [place] To Whom It May Concern: 
 
Preeti Ashley was under the care of [address] from 1/13/17 to present due to medical reasons. She will be able to return to work from home for 4 hours a day on 1/25/17 with no lifting more than 10lbs. If there are questions or concerns please have the patient contact our office.  
 
Sincerely, 
 
 
Charles Kingsley MD

## 2017-01-20 NOTE — PROGRESS NOTES
Surgery  Follow up  Procedure: right mastectomy and SLNB and reconstruction (grayson)  OR date:  1/12/2017  Path:    FINAL PATHOLOGIC DIAGNOSIS   1. Right retro-nipple biopsy:   Benign breast ducts and fibrofatty stroma; negative for carcinoma and DCIS. 2. 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 Right breast skin, excision:   Benign skin with focal changes suggestive of seborrheic keratosis; negative for malignancy. BREAST, Invasive Carcinoma   SYNOPTIC REPORT   SPECIMEN   Procedure: Nipple-sparing mastectomy   Lymph Node Sampling: [place] lymph node(s)   Specimen Laterality: Right   TUMOR   Primary Tumor Site: Invasive Carcinoma: Upper inner quadrant   Histologic Type:  Invasive mammary carcinoma with ductal and lobular features   Glandular (Acinar) / Tubular Differentiation: Score 3 (< 10% of tumor area forming glandular / tubular structures)   Nuclear Pleomorphism: Score 2 (Cells larger than normal with open vesicular nuclei, visible nucleoli, and moderate variability in both size and shape)   Mitotic Rate: Score 1   Number of Mitoses per 10 High-Power Fields: 1 mitotic figures   Overall Grade: Grade 2 (scores of 6 or 7)   Ductal Carcinoma In Situ (DCIS): DCIS is present   Ductal Carcinoma In Situ (DCIS): Positive for EIC   Estimated Size (extent) of DCIS (greatest dimension using gross and microscopic evaluation) is at Least: 18 mm   Number of Blocks with DCIS: 4 Number of Blocks Examined: 13   Architectural Patterns: Solid, Comedo   Nuclear Grade (see Table 2 in CAP Protocol): Grade II (intermediate)   Necrosis: Present, central (expansive \"comedo\" necrosis)   Lobular Carcinoma In Situ (LCIS): Present   Tumor Size: Size of Largest Invasive Carcinoma: Greatest dimension of largest focus of invasion > 1 mm   Greatest Dimension: 2 mm   Tumor Extent   Macroscopic and Microscopic Extent of Tumor   Skin:   Nipple DCIS: DCIS does not involve the nipple epidermis   Accessory Tumor Findings   Lymph-Vascular Invasion: Not identified   Dermal Lymph-Vascular Invasion: Not identified   Microcalcifications: Present in nonneoplastic tissue   Treatment Effect: Response to Presurgical (Neoadjuvant) Therapy: No known presurgical therapy   MARGINS   Invasive Carcinoma: Margins uninvolved by invasive carcinoma 308 [facility] Patient Name: Jesenia Signs from Closest Margin: Distance is > 10 mm   Closest Uninvolved Margin: Anterior   Ductal Carcinoma In Situ (DCIS): Margins uninvolved by DCIS (DCIS present in specimen)   Anterior: 7 mm   LYMPH NODES   Regional Lymph Nodes:   [place] Node Status: [place] lymph node biopsy performed   Number of [place] Nodes Examined: 5   Method of Evaluation of [place] Lymph Node(s): H&E, multiple levels, Immunohistochemistry   Number of Lymph Node(s) Examined (sentinel and nonsentinel): 5   STAGE (pTNM)   Primary Tumor (Invasive Carcinoma) (pT): pT1a   Modifier: (sn)   Category (pN): pN0 (i-)   Comment(s): Assessment of hormone receptors and HER-2 by IHC is pending (block 3-D)     S I feel fine, no fever, chills or sweats    Visit Vitals    /84 (BP 1 Location: Left arm, BP Patient Position: Sitting)    Pulse 75    Resp 18    Ht 5' 6\" (1.676 m)    Wt 88.9 kg (196 lb)    SpO2 97%    BMI 31.64 kg/m2       O Incisions healing well without infection   Some epidermolysis    A/P Doing well   Follow incisions   JP2 with high output serosanguinous. Dr Cally Espitia to manage   Discussed path and likely just an AI.    She has appt with Dr Buell Jeans in February   She may return to work from home next week part time with no lifting if she feels up to it and is off pain medications   RTC 4 weeks       Manuela Mckinnon MD FACS

## 2017-01-20 NOTE — MR AVS SNAPSHOT
Visit Information Date & Time Provider Department Dept. Phone Encounter #  
 1/20/2017  9:20 AM Cait Lackey MD [facility] [address] [phone] [id number] Your Appointments 2/17/2017  8:40 AM  
POST OP with Cait Lackey MD  
[facility] [address] ([address]) Appt Note: 4 week f/u s/p breast surgery [address] [facility] [address] [address]  
[address] [place] [address] [facility], [address] [place] [address] [address] [address] Upcoming Health Maintenance Date Due Hepatitis C Screening 1960 Pneumococcal 19-64 Highest Risk (1 of 3 - PCV13) 10/24/1977 DTaP/Tdap/Td series (1 - Tdap) 10/24/1979 PAP AKA CERVICAL CYTOLOGY 10/24/1979 BREAST CANCER SCRN MAMMOGRAM 10/24/2008 FOBT Q 1 YEAR AGE 50-75 10/24/2008 Allergies as of 1/20/2017  Review Complete On: 1/20/2017 By: Cait Lackey MD  
 No Known Allergies Current Immunizations  Never Reviewed Name Date Influenza Vaccine 10/1/2016 Not reviewed this visit You Were Diagnosed With   
  
 Codes Comments Postoperative examination    -  Primary ICD-10-CM: I60 ICD-9-CM: V67.00 Vitals BP Pulse Resp Height(growth percentile) Weight(growth percentile) SpO2  
 151/84 (BP 1 Location: Left arm, BP Patient Position: Sitting) 75 18 5' 6\" (1.676 m) 196 lb (88.9 kg) 97% BMI OB Status Smoking Status 31.64 kg/m2 Postmenopausal Never Smoker BMI and BSA Data Body Mass Index Body Surface Area  
 31.64 kg/m 2 2.03 m 2 Your Updated Medication List  
  
   
This list is accurate as of: 1/20/17 11:06 AM.  Always use your most recent med list.  
  
  
  
  
 docusate sodium 100 mg capsule Commonly known as:  Ltanya Nawaf Take 1 Cap by mouth two (2) times a day for 90 days. IBUPROFEN IB PO Take  by mouth. oxyCODONE-acetaminophen 5-325 mg per tablet Commonly known as:  PERCOCET Take 1 Tab by mouth every four (4) hours as needed. Max Daily Amount: 6 Tabs. Introducing [facility]! Sofi Castro introduces MaxxAthlete patient portal. Now you can access parts of your medical record, email your doctor's office, and request medication refills online. 1. In your internet browser, go to [email or web address] 2. Click on the First Time User? Click Here link in the Sign In box. You will see the New Member Sign Up page. 3. Enter your MaxxAthlete Access Code exactly as it appears below. You will not need to use this code after youve completed the sign-up process. If you do not sign up before the expiration date, you must request a new code. · MaxxAthlete Access Code: [email or web address] Expires: 3/6/2017  9:39 AM 
 
4. Enter the last four digits of your Social Security Number (xxxx) and Date of Birth (mm/dd/yyyy) as indicated and click Submit. You will be taken to the next sign-up page. 5. Create a MaxxAthlete ID. This will be your MaxxAthlete login ID and cannot be changed, so think of one that is secure and easy to remember. 6. Create a MaxxAthlete password. You can change your password at any time. 7. Enter your Password Reset Question and Answer. This can be used at a later time if you forget your password. 8. Enter your e-mail address. You will receive e-mail notification when new information is available in [address] 9. Click Sign Up. You can now view and download portions of your medical record. 10. Click the Download Summary menu link to download a portable copy of your medical information. If you have questions, please visit the Frequently Asked Questions section of the MaxxAthlete website. Remember, MaxxAthlete is NOT to be used for urgent needs. For medical emergencies, dial 911. Now available from your iPhone and Android! Please provide this summary of care documentation to your next provider. Your primary care clinician is listed as [facility]. If you have any questions after today's visit, please call [phone].

## 2017-02-17 ENCOUNTER — OFFICE VISIT (OUTPATIENT)
Dept: SURGERY | Age: 57
End: 2017-02-17

## 2017-02-17 VITALS
OXYGEN SATURATION: 98 % | BODY MASS INDEX: 31.5 KG/M2 | SYSTOLIC BLOOD PRESSURE: 160 MMHG | RESPIRATION RATE: 24 BRPM | WEIGHT: 196 LBS | DIASTOLIC BLOOD PRESSURE: 89 MMHG | HEART RATE: 74 BPM | HEIGHT: 66 IN

## 2017-02-17 DIAGNOSIS — Z09 POSTOPERATIVE EXAMINATION: Primary | ICD-10-CM

## 2017-02-17 RX ORDER — LETROZOLE 2.5 MG/1
2.5 TABLET, FILM COATED ORAL DAILY
COMMUNITY
End: 2022-05-18 | Stop reason: ALTCHOICE

## 2017-02-17 NOTE — PROGRESS NOTES
Surgery  Follow up  Procedure: right mastectomy and SLNB and reconstruction (grayson)  OR date:  1/12/2017  Path:    FINAL PATHOLOGIC DIAGNOSIS   1. Right retro-nipple biopsy:   Benign breast ducts and fibrofatty stroma; negative for carcinoma and DCIS. 2. 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 Right breast skin, excision:   Benign skin with focal changes suggestive of seborrheic keratosis; negative for malignancy. BREAST, Invasive Carcinoma   SYNOPTIC REPORT   SPECIMEN   Procedure: Nipple-sparing mastectomy   Lymph Node Sampling: [place] lymph node(s)   Specimen Laterality: Right   TUMOR   Primary Tumor Site: Invasive Carcinoma: Upper inner quadrant   Histologic Type:  Invasive mammary carcinoma with ductal and lobular features   Glandular (Acinar) / Tubular Differentiation: Score 3 (< 10% of tumor area forming glandular / tubular structures)   Nuclear Pleomorphism: Score 2 (Cells larger than normal with open vesicular nuclei, visible nucleoli, and moderate variability in both size and shape)   Mitotic Rate: Score 1   Number of Mitoses per 10 High-Power Fields: 1 mitotic figures   Overall Grade: Grade 2 (scores of 6 or 7)   Ductal Carcinoma In Situ (DCIS): DCIS is present   Ductal Carcinoma In Situ (DCIS): Positive for EIC   Estimated Size (extent) of DCIS (greatest dimension using gross and microscopic evaluation) is at Least: 18 mm   Number of Blocks with DCIS: 4   Number of Blocks Examined: 13   Architectural Patterns: Solid, [place]edo   Nuclear Grade (see Table 2 in CAP Protocol): Grade II (intermediate)   Necrosis: Present, central (expansive \"comedo\" necrosis)   Lobular Carcinoma In Situ (LCIS): Present   Tumor Size: Size of Largest Invasive Carcinoma: Greatest dimension of largest focus of invasion > 1 mm   Greatest Dimension: 2 mm   Tumor Extent   Macroscopic and Microscopic Extent of Tumor   Skin:   Nipple DCIS: DCIS does not involve the nipple epidermis   Accessory Tumor Findings   Lymph-Vascular Invasion: Not identified   Dermal Lymph-Vascular Invasion: Not identified   Microcalcifications: Present in nonneoplastic tissue   Treatment Effect: Response to Presurgical (Neoadjuvant) Therapy: No known presurgical therapy   MARGINS   Invasive Carcinoma: Margins uninvolved by invasive carcinoma 308 [facility] Patient Name: Aline Guerra from Closest Margin: Distance is > 10 mm   Closest Uninvolved Margin: Anterior   Ductal Carcinoma In Situ (DCIS): Margins uninvolved by DCIS (DCIS present in specimen)   Anterior: 7 mm   LYMPH NODES   Regional Lymph Nodes:   [place] Node Status: [place] lymph node biopsy performed   Number of [place] Nodes Examined: 5   Method of Evaluation of [place] Lymph Node(s): H&E, multiple levels, Immunohistochemistry   Number of Lymph Node(s) Examined (sentinel and nonsentinel): 5   STAGE (pTNM)   Primary Tumor (Invasive Carcinoma) (pT): pT1a   Modifier: (sn)   Category (pN): pN0 (i-)   Comment(s): Assessment of hormone receptors and HER-2 by IHC is pending (block 3-D)     S I feel fine, no fever, chills or sweats    Visit Vitals    /89 (BP 1 Location: Left arm, BP Patient Position: Sitting)    Pulse 74    Resp 24    Ht 5' 6\" (1.676 m)    Wt 88.9 kg (196 lb)    SpO2 98%    BMI 31.64 kg/m2       O Incisions healing well without infection       A/P Doing well   Ok to go back to work full time   Dr Keke Gallagher started her on letrozole.   Tolerating it ok   Plan 3D left mammogram in Oct 2017   RTC 6 months       Destiney Valdez MD FACS

## 2017-02-17 NOTE — MR AVS SNAPSHOT
Visit Information Date & Time Provider Department Dept. Phone Encounter #  
 2/17/2017  8:40 AM Sonya Herrera MD [facility] [id number] Your Appointments 8/18/2017  8:40 AM  
POST OP with Sonya Herrera MD  
[facility] [address] [address] Cuevas [facility] Appt Note: f/u s/p breast surgery [address] [address]  
[address] [facility],[address], [address] [facility], [address] [place] [address] [address] [address] Upcoming Health Maintenance Date Due Hepatitis C Screening 1960 Pneumococcal 19-64 Highest Risk (1 of 3 - PCV13) 10/24/1977 DTaP/Tdap/Td series (1 - Tdap) 10/24/1979 PAP AKA CERVICAL CYTOLOGY 10/24/1979 BREAST CANCER SCRN MAMMOGRAM 10/24/2008 FOBT Q 1 YEAR AGE 50-75 10/24/2008 Allergies as of 2/17/2017  Review Complete On: 2/17/2017 By: Sonya Herrera MD  
 No Known Allergies Current Immunizations  Never Reviewed Name Date Influenza Vaccine 10/1/2016 Not reviewed this visit You Were Diagnosed With   
  
 Codes Comments Postoperative examination    -  Primary ICD-10-CM: I71 ICD-9-CM: V67.00 Vitals BP Pulse Resp Height(growth percentile) Weight(growth percentile) SpO2  
 160/89 (BP 1 Location: Left arm, BP Patient Position: Sitting) 74 24 5' 6\" (1.676 m) 196 lb (88.9 kg) 98% BMI OB Status Smoking Status 31.64 kg/m2 Postmenopausal Never Smoker BMI and BSA Data Body Mass Index Body Surface Area  
 31.64 kg/m 2 2.03 m 2 Your Updated Medication List  
  
   
This list is accurate as of: 2/17/17  9:10 AM.  Always use your most recent med list.  
  
  
  
  
 IBUPROFEN IB PO Take  by mouth. letrozole 2.5 mg tablet Commonly known as:  [facility] Take 2.5 mg by mouth daily. Introducing [facility]! Arsenio Nuñez introduces Oco patient portal. Now you can access parts of your medical record, email your doctor's office, and request medication refills online. 1. In your internet browser, go to [email or web address] 2. Click on the First Time User? Click Here link in the Sign In box. You will see the New Member Sign Up page. 3. Enter your Oco Access Code exactly as it appears below. You will not need to use this code after youve completed the sign-up process. If you do not sign up before the expiration date, you must request a new code. · Oco Access Code: [email or web address] Expires: 3/6/2017  9:39 AM 
 
4. Enter the last four digits of your Social Security Number (xxxx) and Date of Birth (mm/dd/yyyy) as indicated and click Submit. You will be taken to the next sign-up page. 5. Create a Oco ID. This will be your Oco login ID and cannot be changed, so think of one that is secure and easy to remember. 6. Create a Oco password. You can change your password at any time. 7. Enter your Password Reset Question and Answer. This can be used at a later time if you forget your password. 8. Enter your e-mail address. You will receive e-mail notification when new information is available in [address] 9. Click Sign Up. You can now view and download portions of your medical record. 10. Click the Download Summary menu link to download a portable copy of your medical information. If you have questions, please visit the Frequently Asked Questions section of the Oco website. Remember, Oco is NOT to be used for urgent needs. For medical emergencies, dial 911. Now available from your iPhone and Android! Please provide this summary of care documentation to your next provider. Your primary care clinician is listed as [facility]. If you have any questions after today's visit, please call [phone].

## 2017-05-18 NOTE — PERIOP NOTES
[facility]  Preoperative Instructions        Surgery Date 5/19/17          Time of Arrival 0545    1. On the day of your surgery, please report to the Surgical Services Registration Desk and sign in at your designated time. The Surgery Center is located to the right of the Emergency Room. 2. You must have someone with you to drive you home. You should not drive a car for 24 hours following surgery. Please make arrangements for a friend or family member to stay with you for the first 24 hours after your surgery. 3. Do not have anything to eat or drink (including water, gum, mints, coffee, juice) after midnight 5/18/17              . This may not apply to medications prescribed by your physician. Please note special instructions, if applicable. If you are currently taking Plavix, Coumadin, or other blood-thinning agents, contact your surgeon for instructions. 4. We recommend you do not drink any alcoholic beverages for 24 hours before and after your surgery. 5. Have a list of all current medications, including vitamins, herbal supplements and any other over the counter medications. Stop all Aspirin and non-steroidal anti-inflammatory drugs (I.e. Advil, Aleve), as directed by your surgeon's office. Stop all vitamins and herbal supplements seven days prior to your surgery. 6. Wear comfortable clothes. Wear glasses instead of contacts. Do not bring any money or jewelry. Please bring picture ID, insurance card, and any prearranged co-payment or hospital payment. Do not wear make-up, particularly mascara the morning of your surgery. Do not wear nail polish, particularly if you are having foot /hand surgery. Wear your hair loose or down, no ponytails, buns, juliet pins or clips. All body piercings must be removed.   Please shower with antibacterial soap for three consecutive days before and on the morning of surgery, but do not apply any lotions, powders or deodorants after the shower on the day of surgery. Please use a fresh towels after each shower. Please sleep in clean clothes and change bed linens the night before surgery. Please do not shave for 48 hours prior to surgery. Shaving of the face is acceptable. 7. You should understand that if you do not follow these instructions your surgery may be cancelled. If your physical condition changes (I.e. fever, cold or flu) please contact your surgeon as soon as possible. 8. It is important that you be on time. If a situation occurs where you may be late, please call [phone] (OR Holding Area). 9. If you have any questions and or problems, please call [phone] (Pre-admission Testing). 10. Your surgery time may be subject to change. You will receive a phone call the evening prior if your time changes. 11.  If having outpatient surgery, you must have someone to drive you here, stay with you during the duration of your stay, and to drive you home at time of discharge. Special Instructions:none    MEDICATIONS TO TAKE THE MORNING OF SURGERY WITH A SIP OF WATER:none      I understand a pre-operative phone call will be made to verify my surgery time. In the event that I am not available, I give permission for a message to be left on my answering service and/or with another person?   Yes @ [phone]         ___________________      __________   _________    (Signature of Patient)             (Witness)                (Date and Time)

## 2017-05-19 ENCOUNTER — ANESTHESIA (OUTPATIENT)
Dept: SURGERY | Age: 57
End: 2017-05-19
Payer: COMMERCIAL

## 2017-05-19 ENCOUNTER — HOSPITAL ENCOUNTER (OUTPATIENT)
Age: 57
Setting detail: OUTPATIENT SURGERY
Discharge: HOME | End: 2017-05-19
Attending: PLASTIC SURGERY | Admitting: PLASTIC SURGERY
Payer: COMMERCIAL

## 2017-05-19 ENCOUNTER — ANESTHESIA EVENT (OUTPATIENT)
Dept: SURGERY | Age: 57
End: 2017-05-19
Payer: COMMERCIAL

## 2017-05-19 VITALS
TEMPERATURE: 97.7 F | OXYGEN SATURATION: 95 % | RESPIRATION RATE: 16 BRPM | HEART RATE: 65 BPM | BODY MASS INDEX: 33.31 KG/M2 | WEIGHT: 199.96 LBS | SYSTOLIC BLOOD PRESSURE: 134 MMHG | HEIGHT: 65 IN | DIASTOLIC BLOOD PRESSURE: 61 MMHG

## 2017-05-19 PROCEDURE — 74011250636 HC RX REV CODE- 250/636: Performed by: ANESTHESIOLOGY

## 2017-05-19 PROCEDURE — 76210000020 HC REC RM PH II FIRST 0.5 HR: Performed by: PLASTIC SURGERY

## 2017-05-19 PROCEDURE — 77030031139 HC SUT VCRL2 J&J -A: Performed by: PLASTIC SURGERY

## 2017-05-19 PROCEDURE — 74011250636 HC RX REV CODE- 250/636: Performed by: PLASTIC SURGERY

## 2017-05-19 PROCEDURE — 74011250636 HC RX REV CODE- 250/636

## 2017-05-19 PROCEDURE — 77030002933 HC SUT MCRYL J&J -A: Performed by: PLASTIC SURGERY

## 2017-05-19 PROCEDURE — 77030032490 HC SLV COMPR SCD KNE COVD -B: Performed by: PLASTIC SURGERY

## 2017-05-19 PROCEDURE — 77030011264 HC ELECTRD BLD EXT COVD -A: Performed by: PLASTIC SURGERY

## 2017-05-19 PROCEDURE — 77030011640 HC PAD GRND REM COVD -A: Performed by: PLASTIC SURGERY

## 2017-05-19 PROCEDURE — 77030002966 HC SUT PDS J&J -A: Performed by: PLASTIC SURGERY

## 2017-05-19 PROCEDURE — 74011000250 HC RX REV CODE- 250

## 2017-05-19 PROCEDURE — 77030008459 HC STPLR SKN COOP -B: Performed by: PLASTIC SURGERY

## 2017-05-19 PROCEDURE — 74011000250 HC RX REV CODE- 250: Performed by: PLASTIC SURGERY

## 2017-05-19 PROCEDURE — 77030008467 HC STPLR SKN COVD -B: Performed by: PLASTIC SURGERY

## 2017-05-19 PROCEDURE — 77030026227 HC FUNL KELLR 2 PCH ALGN -C: Performed by: PLASTIC SURGERY

## 2017-05-19 PROCEDURE — 76210000016 HC OR PH I REC 1 TO 1.5 HR: Performed by: PLASTIC SURGERY

## 2017-05-19 PROCEDURE — 77030013079 HC BLNKT BAIR HGGR 3M -A: Performed by: NURSE ANESTHETIST, CERTIFIED REGISTERED

## 2017-05-19 PROCEDURE — 77030013674 HC FIL EXPND TISS ALGN -A: Performed by: PLASTIC SURGERY

## 2017-05-19 PROCEDURE — 77030020143 HC AIRWY LARYN INTUB CGAS -A: Performed by: NURSE ANESTHETIST, CERTIFIED REGISTERED

## 2017-05-19 PROCEDURE — 76010000153 HC OR TIME 1.5 TO 2 HR: Performed by: PLASTIC SURGERY

## 2017-05-19 PROCEDURE — 77030020263 HC SOL INJ SOD CL0.9% LFCR 1000ML: Performed by: PLASTIC SURGERY

## 2017-05-19 PROCEDURE — 74011000272 HC RX REV CODE- 272: Performed by: PLASTIC SURGERY

## 2017-05-19 PROCEDURE — C1789 PROSTHESIS, BREAST, IMP: HCPCS | Performed by: PLASTIC SURGERY

## 2017-05-19 PROCEDURE — 77030010507 HC ADH SKN DERMBND J&J -B: Performed by: PLASTIC SURGERY

## 2017-05-19 PROCEDURE — 76060000034 HC ANESTHESIA 1.5 TO 2 HR: Performed by: PLASTIC SURGERY

## 2017-05-19 PROCEDURE — 77030020782 HC GWN BAIR PAWS FLX 3M -B

## 2017-05-19 DEVICE — SMOOTH ROUND MODERATE PLUS PROFILE GEL-FILLED BREAST IMPLANT, 800CC  SMOOTH ROUND SILICONE
Type: IMPLANTABLE DEVICE | Site: BREAST | Status: FUNCTIONAL
Brand: MENTOR MEMORYGEL BREAST IMPLANT

## 2017-05-19 RX ORDER — FENTANYL CITRATE 50 UG/ML
50 INJECTION, SOLUTION INTRAMUSCULAR; INTRAVENOUS AS NEEDED
Status: CANCELLED | OUTPATIENT
Start: 2017-05-19

## 2017-05-19 RX ORDER — MIDAZOLAM HYDROCHLORIDE 1 MG/ML
0.5 INJECTION, SOLUTION INTRAMUSCULAR; INTRAVENOUS
Status: DISCONTINUED | OUTPATIENT
Start: 2017-05-19 | End: 2017-05-19 | Stop reason: HOSPADM

## 2017-05-19 RX ORDER — OXYCODONE AND ACETAMINOPHEN 5; 325 MG/1; MG/1
1 TABLET ORAL AS NEEDED
Status: DISCONTINUED | OUTPATIENT
Start: 2017-05-19 | End: 2017-05-19 | Stop reason: HOSPADM

## 2017-05-19 RX ORDER — PROPOFOL 10 MG/ML
INJECTION, EMULSION INTRAVENOUS AS NEEDED
Status: DISCONTINUED | OUTPATIENT
Start: 2017-05-19 | End: 2017-05-19 | Stop reason: HOSPADM

## 2017-05-19 RX ORDER — SODIUM CHLORIDE 0.9 % (FLUSH) 0.9 %
5-10 SYRINGE (ML) INJECTION AS NEEDED
Status: CANCELLED | OUTPATIENT
Start: 2017-05-19

## 2017-05-19 RX ORDER — SODIUM CHLORIDE, SODIUM LACTATE, POTASSIUM CHLORIDE, CALCIUM CHLORIDE 600; 310; 30; 20 MG/100ML; MG/100ML; MG/100ML; MG/100ML
75 INJECTION, SOLUTION INTRAVENOUS CONTINUOUS
Status: DISCONTINUED | OUTPATIENT
Start: 2017-05-19 | End: 2017-05-19 | Stop reason: HOSPADM

## 2017-05-19 RX ORDER — FENTANYL CITRATE 50 UG/ML
25 INJECTION, SOLUTION INTRAMUSCULAR; INTRAVENOUS
Status: DISCONTINUED | OUTPATIENT
Start: 2017-05-19 | End: 2017-05-19 | Stop reason: HOSPADM

## 2017-05-19 RX ORDER — HYDROCODONE BITARTRATE AND ACETAMINOPHEN 5; 325 MG/1; MG/1
1-2 TABLET ORAL
Qty: 30 TAB | Refills: 0 | Status: SHIPPED | OUTPATIENT
Start: 2017-05-19 | End: 2017-08-19 | Stop reason: ALTCHOICE

## 2017-05-19 RX ORDER — SODIUM CHLORIDE 0.9 % (FLUSH) 0.9 %
5-10 SYRINGE (ML) INJECTION AS NEEDED
Status: DISCONTINUED | OUTPATIENT
Start: 2017-05-19 | End: 2017-05-19 | Stop reason: HOSPADM

## 2017-05-19 RX ORDER — CEPHALEXIN 500 MG/1
500 CAPSULE ORAL 4 TIMES DAILY
Qty: 20 CAP | Refills: 0 | Status: SHIPPED | OUTPATIENT
Start: 2017-05-19 | End: 2017-05-24

## 2017-05-19 RX ORDER — MIDAZOLAM HYDROCHLORIDE 1 MG/ML
1 INJECTION, SOLUTION INTRAMUSCULAR; INTRAVENOUS AS NEEDED
Status: CANCELLED | OUTPATIENT
Start: 2017-05-19

## 2017-05-19 RX ORDER — ONDANSETRON 2 MG/ML
4 INJECTION INTRAMUSCULAR; INTRAVENOUS AS NEEDED
Status: DISCONTINUED | OUTPATIENT
Start: 2017-05-19 | End: 2017-05-19 | Stop reason: HOSPADM

## 2017-05-19 RX ORDER — DEXAMETHASONE SODIUM PHOSPHATE 4 MG/ML
INJECTION, SOLUTION INTRA-ARTICULAR; INTRALESIONAL; INTRAMUSCULAR; INTRAVENOUS; SOFT TISSUE AS NEEDED
Status: DISCONTINUED | OUTPATIENT
Start: 2017-05-19 | End: 2017-05-19 | Stop reason: HOSPADM

## 2017-05-19 RX ORDER — ONDANSETRON 2 MG/ML
INJECTION INTRAMUSCULAR; INTRAVENOUS AS NEEDED
Status: DISCONTINUED | OUTPATIENT
Start: 2017-05-19 | End: 2017-05-19 | Stop reason: HOSPADM

## 2017-05-19 RX ORDER — SODIUM CHLORIDE 9 MG/ML
50 INJECTION, SOLUTION INTRAVENOUS CONTINUOUS
Status: DISCONTINUED | OUTPATIENT
Start: 2017-05-19 | End: 2017-05-19 | Stop reason: HOSPADM

## 2017-05-19 RX ORDER — MORPHINE SULFATE 10 MG/ML
2 INJECTION, SOLUTION INTRAMUSCULAR; INTRAVENOUS
Status: DISCONTINUED | OUTPATIENT
Start: 2017-05-19 | End: 2017-05-19 | Stop reason: HOSPADM

## 2017-05-19 RX ORDER — SODIUM CHLORIDE, SODIUM LACTATE, POTASSIUM CHLORIDE, CALCIUM CHLORIDE 600; 310; 30; 20 MG/100ML; MG/100ML; MG/100ML; MG/100ML
25 INJECTION, SOLUTION INTRAVENOUS CONTINUOUS
Status: DISCONTINUED | OUTPATIENT
Start: 2017-05-19 | End: 2017-05-19 | Stop reason: HOSPADM

## 2017-05-19 RX ORDER — CEFAZOLIN SODIUM IN 0.9 % NACL 2 G/100 ML
2 PLASTIC BAG, INJECTION (ML) INTRAVENOUS ONCE
Status: COMPLETED | OUTPATIENT
Start: 2017-05-19 | End: 2017-05-19

## 2017-05-19 RX ORDER — HYDROMORPHONE HYDROCHLORIDE 2 MG/ML
INJECTION, SOLUTION INTRAMUSCULAR; INTRAVENOUS; SUBCUTANEOUS AS NEEDED
Status: DISCONTINUED | OUTPATIENT
Start: 2017-05-19 | End: 2017-05-19 | Stop reason: HOSPADM

## 2017-05-19 RX ORDER — SODIUM CHLORIDE 9 MG/ML
50 INJECTION, SOLUTION INTRAVENOUS CONTINUOUS
Status: CANCELLED | OUTPATIENT
Start: 2017-05-19 | End: 2017-05-20

## 2017-05-19 RX ORDER — SODIUM CHLORIDE, SODIUM LACTATE, POTASSIUM CHLORIDE, CALCIUM CHLORIDE 600; 310; 30; 20 MG/100ML; MG/100ML; MG/100ML; MG/100ML
75 INJECTION, SOLUTION INTRAVENOUS CONTINUOUS
Status: CANCELLED | OUTPATIENT
Start: 2017-05-19 | End: 2017-05-20

## 2017-05-19 RX ORDER — HYDROMORPHONE HYDROCHLORIDE 1 MG/ML
0.2 INJECTION, SOLUTION INTRAMUSCULAR; INTRAVENOUS; SUBCUTANEOUS
Status: DISCONTINUED | OUTPATIENT
Start: 2017-05-19 | End: 2017-05-19 | Stop reason: HOSPADM

## 2017-05-19 RX ORDER — SODIUM CHLORIDE 0.9 % (FLUSH) 0.9 %
5-10 SYRINGE (ML) INJECTION EVERY 8 HOURS
Status: CANCELLED | OUTPATIENT
Start: 2017-05-19

## 2017-05-19 RX ORDER — KETOROLAC TROMETHAMINE 30 MG/ML
INJECTION, SOLUTION INTRAMUSCULAR; INTRAVENOUS AS NEEDED
Status: DISCONTINUED | OUTPATIENT
Start: 2017-05-19 | End: 2017-05-19 | Stop reason: HOSPADM

## 2017-05-19 RX ORDER — LIDOCAINE HYDROCHLORIDE 20 MG/ML
INJECTION, SOLUTION EPIDURAL; INFILTRATION; INTRACAUDAL; PERINEURAL AS NEEDED
Status: DISCONTINUED | OUTPATIENT
Start: 2017-05-19 | End: 2017-05-19 | Stop reason: HOSPADM

## 2017-05-19 RX ORDER — DIPHENHYDRAMINE HYDROCHLORIDE 50 MG/ML
12.5 INJECTION, SOLUTION INTRAMUSCULAR; INTRAVENOUS AS NEEDED
Status: DISCONTINUED | OUTPATIENT
Start: 2017-05-19 | End: 2017-05-19 | Stop reason: HOSPADM

## 2017-05-19 RX ORDER — MIDAZOLAM HYDROCHLORIDE 1 MG/ML
INJECTION, SOLUTION INTRAMUSCULAR; INTRAVENOUS AS NEEDED
Status: DISCONTINUED | OUTPATIENT
Start: 2017-05-19 | End: 2017-05-19 | Stop reason: HOSPADM

## 2017-05-19 RX ORDER — LIDOCAINE HYDROCHLORIDE 10 MG/ML
0.1 INJECTION, SOLUTION EPIDURAL; INFILTRATION; INTRACAUDAL; PERINEURAL AS NEEDED
Status: CANCELLED | OUTPATIENT
Start: 2017-05-19

## 2017-05-19 RX ORDER — FENTANYL CITRATE 50 UG/ML
INJECTION, SOLUTION INTRAMUSCULAR; INTRAVENOUS AS NEEDED
Status: DISCONTINUED | OUTPATIENT
Start: 2017-05-19 | End: 2017-05-19 | Stop reason: HOSPADM

## 2017-05-19 RX ORDER — ACETAMINOPHEN 10 MG/ML
INJECTION, SOLUTION INTRAVENOUS AS NEEDED
Status: DISCONTINUED | OUTPATIENT
Start: 2017-05-19 | End: 2017-05-19 | Stop reason: HOSPADM

## 2017-05-19 RX ADMIN — FENTANYL CITRATE 50 MCG: 50 INJECTION, SOLUTION INTRAMUSCULAR; INTRAVENOUS at 08:11

## 2017-05-19 RX ADMIN — ONDANSETRON 4 MG: 2 INJECTION INTRAMUSCULAR; INTRAVENOUS at 09:11

## 2017-05-19 RX ADMIN — LIDOCAINE HYDROCHLORIDE 60 MG: 20 INJECTION, SOLUTION EPIDURAL; INFILTRATION; INTRACAUDAL; PERINEURAL at 07:46

## 2017-05-19 RX ADMIN — FENTANYL CITRATE 100 MCG: 50 INJECTION, SOLUTION INTRAMUSCULAR; INTRAVENOUS at 07:46

## 2017-05-19 RX ADMIN — KETOROLAC TROMETHAMINE 30 MG: 30 INJECTION, SOLUTION INTRAMUSCULAR; INTRAVENOUS at 09:11

## 2017-05-19 RX ADMIN — DEXAMETHASONE SODIUM PHOSPHATE 10 MG: 4 INJECTION, SOLUTION INTRA-ARTICULAR; INTRALESIONAL; INTRAMUSCULAR; INTRAVENOUS; SOFT TISSUE at 08:28

## 2017-05-19 RX ADMIN — MIDAZOLAM HYDROCHLORIDE 2 MG: 1 INJECTION, SOLUTION INTRAMUSCULAR; INTRAVENOUS at 07:39

## 2017-05-19 RX ADMIN — CEFAZOLIN 2 G: 10 INJECTION, POWDER, FOR SOLUTION INTRAVENOUS; PARENTERAL at 08:00

## 2017-05-19 RX ADMIN — PROPOFOL 160 MG: 10 INJECTION, EMULSION INTRAVENOUS at 07:46

## 2017-05-19 RX ADMIN — SODIUM CHLORIDE, POTASSIUM CHLORIDE, SODIUM LACTATE AND CALCIUM CHLORIDE: 600; 310; 30; 20 INJECTION, SOLUTION INTRAVENOUS at 07:11

## 2017-05-19 RX ADMIN — FENTANYL CITRATE 50 MCG: 50 INJECTION, SOLUTION INTRAMUSCULAR; INTRAVENOUS at 07:56

## 2017-05-19 RX ADMIN — SODIUM CHLORIDE, POTASSIUM CHLORIDE, SODIUM LACTATE AND CALCIUM CHLORIDE: 600; 310; 30; 20 INJECTION, SOLUTION INTRAVENOUS at 09:22

## 2017-05-19 RX ADMIN — ACETAMINOPHEN 1000 MG: 10 INJECTION, SOLUTION INTRAVENOUS at 08:46

## 2017-05-19 RX ADMIN — HYDROMORPHONE HYDROCHLORIDE 0.5 MG: 2 INJECTION, SOLUTION INTRAMUSCULAR; INTRAVENOUS; SUBCUTANEOUS at 08:34

## 2017-05-19 RX ADMIN — HYDROMORPHONE HYDROCHLORIDE 0.5 MG: 2 INJECTION, SOLUTION INTRAMUSCULAR; INTRAVENOUS; SUBCUTANEOUS at 09:10

## 2017-05-19 NOTE — IP AVS SNAPSHOT
[address] [facility] 
[phone] Patient: Jesenia Pitt MRN: [id number] You are allergic to the following No active allergies Recent Documentation Height Weight BMI OB Status Smoking Status 1.651 m 90.7 kg 33.27 kg/m2 Postmenopausal Never Smoker Emergency Contacts Name Discharge Info Relation Home Work Mobile Raz Bailey  Spouse [3] [phone] Marco Bailey  Spouse [3] [phone] About your hospitalization You were admitted on:  May 19, 2017 You last received care in the:  [facility] PACU You were discharged on:  May 19, 2017 Unit phone number:  [phone] Why you were hospitalized Your primary diagnosis was:  Not on File Providers Seen During Your Hospitalizations Provider Role Specialty Primary office phone Jordi Shannon MD Attending Provider Plastic Surgery [phone] Your Primary Care Physician (PCP) Primary Care Physician Office Phone Office Fax [phone] [facility], [address] [place] [address] [phone] Follow-up Information Follow up With Details Comments Contact Info Vera Cardeans MD   [address] [facility] 
[phone] Current Discharge Medication List  
  
START taking these medications Dose & Instructions Dispensing Information Comments Morning Noon Evening Bedtime  
 cephALEXin 500 mg capsule Commonly known as:  Lyssa Neer Your last dose was: Your next dose is:    
   
   
 Dose:  500 mg Take 1 Cap by mouth four (4) times daily for 5 days. Quantity:  20 Cap Refills:  0 HYDROcodone-acetaminophen 5-325 mg per tablet Commonly known as:  Jarome Elodia Your last dose was: Your next dose is:    
   
   
 Dose:  1-2 Tab Take 1-2 Tabs by mouth every four (4) hours as needed for Pain. Max Daily Amount: 12 Tabs. Quantity:  30 Tab Refills:  0 CONTINUE these medications which have NOT CHANGED Dose & Instructions Dispensing Information Comments Morning Noon Evening Bedtime  
 letrozole 2.5 mg tablet Commonly known as:  [facility] Your last dose was: Your next dose is:    
   
   
 Dose:  2.5 mg Take 2.5 mg by mouth daily. Refills:  0 Where to Get Your Medications Information on where to get these meds will be given to you by the nurse or doctor. ! Ask your nurse or doctor about these medications  
  cephALEXin 500 mg capsule HYDROcodone-acetaminophen 5-325 mg per tablet Discharge Instructions May remove dressings in 24hrs and shower. Wear bra at all times unless showering. DISCHARGE SUMMARY from Nurse The following personal items are in your possession at time of discharge: 
 
Dental Appliances: None Visual Aid: Contacts Hearing Aids/Status: Does not own Home Medications: None Jewelry: None Clothing: Undergarments, Pants, Shirt, Footwear, Socks Other Valuables: None PATIENT INSTRUCTIONS: 
 
After general anesthesia or intravenous sedation, for 24 hours or while taking prescription Narcotics: · Limit your activities · Do not drive and operate hazardous machinery · Do not make important personal or business decisions · Do  not drink alcoholic beverages · If you have not urinated within 8 hours after discharge, please contact your surgeon on call. Report the following to your surgeon: 
· Excessive pain, swelling, redness or odor of or around the surgical area · Temperature over 100.5 · Nausea and vomiting lasting longer than 4 hours or if unable to take medications · Any signs of decreased circulation or nerve impairment to extremity: change in color, persistent  numbness, tingling, coldness or increase pain · Any questions What to do at Home: 
Please carry medication information at all times in case of emergency situations. A common side effect of anesthesia following surgery is nausea and/or vomiting. In order to decrease symptoms, it is wise to avoid foods that are high in fat, greasy foods, milk products, and spicy foods for the first 24 hours. Acceptable foods for the first 24 hours following surgery include but are not limited to: 
 
? soup 
? broth 
?  toast  
? crackers ? applesauce 
? bananas  
? mashed potatoes, 
? soft or scrambled eggs 
? oatmeal 
?  jello It is important to eat when taking your pain medication. This will help to prevent nausea. If possible, please try to time your meals with your medications. It is very important to stay hydrated following surgery. Sip fluids frequently while awake. Avoid acidic drinks such as citrus juices and soda for 24 hours. Carbonated beverages may cause bloating and gas. Acceptable fluids include: 
 
? water (flavor packets may add variety) ? coffee or tea (in moderation) ? Gatorade ? Steven Felton ? apple juice 
? cranberry juice You are encouraged to cough and deep breathe every hour when awake. This will help to prevent respiratory complications following anesthesia. You may want to hug a pillow when coughing and sneezing to add additional support to the surgical area and to decrease discomfort if you had abdominal or chest surgery. If you are discharged home with support stockings, you may remove them after 24 hours. Support stockings are used to help prevent blood clots in the legs following surgery. Please take time to review all of your Home Care Instructions and Medication Information sheets provided in your discharge packet. If you have any questions, please contact your surgeons office. Thank you. How to Care for Your Wound After Its Treated With DERMABOND* Topical Skin Adhesive DERMABOND* Topical Skin Adhesive (2-octyl cyanoacrylate) is a sterile, liquid skin adhesive 
that holds wound edges together. The film will usually remain in place for 5 to 10 days, then 
naturally fall off your skin. The following will answer some of your questions and provide instructions for proper care for your 
wound while it is healing: CHECK WOUND APPEARANCE 
 Some swelling, redness, and pain are common with all wounds and normally will go away as the 
wound heals. If swelling, redness, or pain increases or if the wound feels warm to the touch, 
contact a doctor. Also contact a doctor if the wound edges reopen or separate. REPLACE BANDAGES 
 If your wound is bandaged, keep the bandage dry.  Replace the dressing daily until the adhesive film has fallen off or if the 
bandage should become wet, unless otherwise instructed by your 
physician.  When changing the dressing, do not place tape directly over the DERMABOND adhesive film, because removing the tape later may also 
remove the film. AVOID TOPICAL MEDICATIONS  Do not apply liquid or ointment medications or any other product to your wound while the DERMABOND adhesive film is in place. These may loosen the film before your wound is healed. KEEP WOUND DRY AND PROTECTED  You may occasionally and briefly wet your wound in the shower or bath. Do not soak or scrub 
your wound, do not swim, and avoid periods of heavy perspiration until the DERMABOND 
adhesive has naturally fallen off. After showering or bathing, gently blot your wound dry with a 
soft towel. If a protective dressing is being used, apply a fresh, dry bandage, being sure to keep 
the tape off the DERMABOND adhesive film.  Apply a clean, dry bandage over the wound if necessary to protect it.  
 Protect your wound from injury until the skin has had sufficient time to heal. 
  Do not scratch, rub, or pick at the DERMABOND adhesive film. This may loosen the film before 
your wound is healed.  Protect the wound from prolonged exposure to sunlight or tanning lamps while the film is in 
place. If you have any questions or concerns about this product, please consult your doctor. *Trademark ©[employer] 2002Hydrocodone/Acetaminophen (By mouth) Acetaminophen (l-yxty-a-MIN-oh-fen), Hydrocodone Bitartrate (nyh-riyl-WXG-done bye-TAR-trate) Treats pain. This medicine contains a narcotic pain reliever. Brand Name(s): Hycet, Lorcet, Lorcet HD, Lorcet Plus, Lortab 10/325, Lortab 5/325, Lortab 7.5/325, Lortab Elixir, Norco, Verdrocet, Vicodin, Vicodin ES, Vicodin HP, Xodol, Xodol 5/300 There may be other brand names for this medicine. When This Medicine Should Not Be Used: This medicine is not right for everyone. Do not use it if you had an allergic reaction to acetaminophen, hydrocodone, or other narcotic medicines, or stomach or bowel blockage (including paralytic ileus). How to Use This Medicine:  
Capsule, Liquid, Tablet · Your doctor will tell you how much medicine to use. Do not use more than directed. · An overdose can be dangerous. Follow directions carefully so you do not get too much medicine at one time. · Oral liquid: Measure the oral liquid medicine with a marked measuring spoon, oral syringe, or medicine cup. · Drink plenty of liquids to help avoid constipation. · This medicine should come with a Medication Guide. Ask your pharmacist for a copy if you do not have one. · Missed dose: Take a dose as soon as you remember. If it is almost time for your next dose, wait until then and take a regular dose. Do not take extra medicine to make up for a missed dose. · Store the medicine in a closed container at room temperature, away from heat, moisture, and direct light. Flush any unused Norco® tablets down the toilet. Drugs and Foods to Avoid: Ask your doctor or pharmacist before using any other medicine, including over-the-counter medicines, vitamins, and herbal products. · Do not use this medicine if you are using or have used an MAO inhibitor within the past 14 days. · Some medicines can affect how hydrocodone/acetaminophen works. Tell your doctor if you are using any of the following: ¨ Carbamazepine, erythromycin, ketoconazole, mirtazapine, phenytoin, rifampin, ritonavir, tramadol, trazodone ¨ Diuretic (water pill) ¨ Medicine to treat depression or mental health problems ¨ Medicine to treat migraine headaches ¨ Phenothiazine medicine · Tell your doctor if you use anything else that makes you sleepy. Some examples are allergy medicine, narcotic pain medicine, and alcohol. Tell your doctor if you are using buprenorphine, butorphanol, nalbuphine, pentazocine, or a muscle relaxer. · Do not drink alcohol while you are using this medicine. Acetaminophen can damage your liver, and your risk is higher if you also drink alcohol. Warnings While Using This Medicine: · Tell your doctor if you are pregnant or breastfeeding, or if you have kidney disease, liver disease, lung or breathing problems, gallbladder or pancreas problems, an underactive thyroid, [place] disease, prostate problems, trouble urinating, stomach problems, or a history of head injury or brain tumor, seizures, alcohol or drug addiction. · This medicine may cause the following problems:  
¨ High risk of overdose, which can lead to death ¨ Respiratory depression (serious breathing problem that can be life-threatening) ¨ Liver problems ¨ Serious skin reactions ¨ Serotonin syndrome (when used with certain medicines) · This medicine can be habit-forming. Do not use more than your prescribed dose. Call your doctor if you think your medicine is not working. · This medicine may make you dizzy or drowsy.  Do not drive or doing anything else that could be dangerous until you know how this medicine affects you. · This medicine contains acetaminophen. Read the labels of all other medicines you are using to see if they also contain acetaminophen, or ask your doctor or pharmacist. Gray Lewis not use more than 4 grams (4,000 milligrams) total of acetaminophen in one day. · Tell any doctor or dentist who treats you that you are using this medicine. This medicine may affect certain medical test results. · This medicine may cause constipation, especially with long-term use. Ask your doctor if you should use a laxative to prevent and treat constipation. · This medicine could cause infertility. Talk with your doctor before using this medicine if you plan to have children. · Keep all medicine out of the reach of children. Never share your medicine with anyone. Possible Side Effects While Using This Medicine:  
Call your doctor right away if you notice any of these side effects: · Allergic reaction: Itching or hives, swelling in your face or hands, swelling or tingling in your mouth or throat, chest tightness, trouble breathing · Anxiety, restlessness, fast heartbeat, fever, sweating, muscle spasms, twitching, diarrhea, seeing or hearing things that are not there · Blistering, peeling, red skin rash · Blue lips, fingernails, or skin · Dark urine or pale stools, loss of appetite, nausea or vomiting, stomach pain, yellow skin or eyes · Extreme weakness, shallow breathing, slow heartbeat, sweating, seizures, cold or clammy skin · Lightheadedness, dizziness, fainting If you notice these less serious side effects, talk with your doctor: · Constipation, nausea, vomiting · Tiredness or sleepiness If you notice other side effects that you think are caused by this medicine, tell your doctor. Call your doctor for medical advice about side effects. You may report side effects to FDA at 7-824-ZIJ-6421 © 2017 [address] Information is for End User's use only and may not be sold, redistributed or otherwise used for commercial purposes. The above information is an  only. It is not intended as medical advice for individual conditions or treatments. Talk to your doctor, nurse or pharmacist before following any medical regimen to see if it is safe and effective for you. These are general instructions for a healthy lifestyle: No smoking/ No tobacco products/ Avoid exposure to second hand smoke Surgeon General's Warning:  Quitting smoking now greatly reduces serious risk to your health. Obesity, smoking, and sedentary lifestyle greatly increases your risk for illness A healthy diet, regular physical exercise & weight monitoring are important for maintaining a healthy lifestyle You may be retaining fluid if you have a history of heart failure or if you experience any of the following symptoms:  Weight gain of 3 pounds or more overnight or 5 pounds in a week, increased swelling in our hands or feet or shortness of breath while lying flat in bed. Please call your doctor as soon as you notice any of these symptoms; do not wait until your next office visit. Recognize signs and symptoms of STROKE: 
 
F-face looks uneven A-arms unable to move or move unevenly S-speech slurred or non-existent T-time-call 911 as soon as signs and symptoms begin-DO NOT go Back to bed or wait to see if you get better-TIME IS BRAIN. Warning Signs of HEART ATTACK Call 911 if you have these symptoms: 
? Chest discomfort. Most heart attacks involve discomfort in the center of the chest that lasts more than a few minutes, or that goes away and comes back. It can feel like uncomfortable pressure, squeezing, fullness, or pain. ? Discomfort in other areas of the upper body. Symptoms can include pain or discomfort in one or both arms, the back, neck, jaw, or stomach. ? Shortness of breath with or without chest discomfort. ? Other signs may include breaking out in a cold sweat, nausea, or lightheadedness. Don't wait more than five minutes to call [address]! Fast action can save your life. Calling 911 is almost always the fastest way to get lifesaving treatment. Emergency Medical Services staff can begin treatment when they arrive  up to an hour sooner than if someone gets to the hospital by car. The discharge information has been reviewed with the patient and caregiver. The patient and caregiver verbalized understanding. Discharge medications reviewed with the patient and caregiver and appropriate educational materials and side effects teaching were provided. Discharge Orders None Introducing [facility] SERVICES! [employer] introduces [employer] patient portal. Now you can access parts of your medical record, email your doctor's office, and request medication refills online. 1. In your internet browser, go to [email or web address] 2. Click on the First Time User? Click Here link in the Sign In box. You will see the New Member Sign Up page. 3. Enter your [employer] Access Code exactly as it appears below. You will not need to use this code after youve completed the sign-up process. If you do not sign up before the expiration date, you must request a new code. · [employer] Access Code: [email or web address] Expires: 8/17/2017  6:08 AM 
 
4. Enter the last four digits of your Social Security Number (xxxx) and Date of Birth (mm/dd/yyyy) as indicated and click Submit. You will be taken to the next sign-up page. 5. Create a [employer] ID. This will be your [employer] login ID and cannot be changed, so think of one that is secure and easy to remember. 6. Create a [employer] password. You can change your password at any time. 7. Enter your Password Reset Question and Answer.  This can be used at a later time if you forget your password. 8. Enter your e-mail address. You will receive e-mail notification when new information is available in [address] 9. Click Sign Up. You can now view and download portions of your medical record. 10. Click the Download Summary menu link to download a portable copy of your medical information. If you have questions, please visit the Frequently Asked Questions section of the [employer] website. Remember, [employer] is NOT to be used for urgent needs. For medical emergencies, dial 911. Now available from your iPhone and Android! General Information Please provide this summary of care documentation to your next provider. Patient Signature:  ____________________________________________________________ Date:  ____________________________________________________________  
  
Angelika Gibson Provider Signature:  ____________________________________________________________ Date:  ____________________________________________________________ Clothing/Cell Phone/PDA (specify)

## 2017-05-19 NOTE — ANESTHESIA POSTPROCEDURE EVALUATION
Post-Anesthesia Evaluation and Assessment    Patient: Jennifer Blankenship MRN: [id number]  SSN: [id number]    YOB: 1960  Age: 60 y.o. Sex: female       Cardiovascular Function/Vital Signs  Visit Vitals    /72 (BP 1 Location: Left arm, BP Patient Position: At rest)    Pulse 67    Temp 36.5 °C (97.7 °F)    Resp 16    Ht 5' 5\" (1.651 m)    Wt 90.7 kg (199 lb 15.3 oz)    SpO2 95%    BMI 33.27 kg/m2       Patient is status post general anesthesia for Procedure(s):  EXCHANGE RIGHT TISSUE EXPANDER FOR IMPLANT, LEFT MASTOPEXY  BREAST MASTOPEXY. Nausea/Vomiting: None    Postoperative hydration reviewed and adequate. Pain:  Pain Scale 1: Numeric (0 - 10) (05/19/17 1015)  Pain Intensity 1: 0 (05/19/17 1015)   Managed    Neurological Status:   Neuro (WDL): Exceptions to WDL (05/19/17 0944)  Neuro  Neurologic State: Drowsy; Eyes open to voice (05/19/17 0944)  Orientation Level: Oriented X4 (05/19/17 0944)  Cognition: Follows commands (05/19/17 0944)  Speech: Slurred (05/19/17 0944)  LUE Motor Response: Purposeful (05/19/17 0944)  LLE Motor Response: Purposeful (05/19/17 0944)  RUE Motor Response: Purposeful (05/19/17 0944)  RLE Motor Response: Purposeful (05/19/17 0944)   At baseline    Mental Status and Level of Consciousness: Alert and oriented     Pulmonary Status:   O2 Device: Room air (05/19/17 1030)   Adequate oxygenation and airway patent    Complications related to anesthesia: None    Post-anesthesia assessment completed.  No concerns    Signed By: Fozia Berumen DO     May 19, 2017

## 2017-05-19 NOTE — OP NOTES
[address], [address]   OP NOTE       Name:  Rolando Hennessy   MR#:  [id number]   :  1960   Account #:  [de-identified]    Surgery Date:  2017   Date of Adm:  2017       PREOPERATIVE DIAGNOSES:   1. Personal history of right breast cancer. 2. Acquired absence of right breast.   3. 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 4. Left breast ptosis. POSTOPERATIVE DIAGNOSES:   1. Personal history of right breast cancer. 2. Acquired absence of right breast.   3. 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 4. Left breast ptosis. PROCEDURES PERFORMED:   1. Removal of right breast tissue expander and delayed placement of   gel implant for reconstruction ([place] 800 mL moderate plus profile,   smooth, round gel). 2. Left mastopexy. SURGEON: Kendal Tenorio MD     ANESTHESIA: General.    ESTIMATED BLOOD LOSS: Approximately 50 mL. SPECIMENS REMOVED: None. COMPLICATIONS: None. INDICATIONS FOR PROCEDURE: This 49-year-old white female had   previously undergone a right nipple-sparing mastectomy and expander   reconstruction for right breast cancer. She has been expanded   uneventfully and was ready for removal of the expander and placement   of a permanent gel implant. She had significant left breast ptosis   resulting in breast asymmetry after reconstruction and therefore   required mastopexy to return to normal function appearance. Risks,   benefits, and alternatives were discussed preoperatively, and she   agreed to proceed. DESCRIPTION OF PROCEDURE: After informed consent was   obtained, she was marked in the preoperative holding area. She was   then taken to the operating room, where general anesthetic was given. Breasts were infiltrated with a dilute mixture of 1% lidocaine with   epinephrine and 0.25% bupivacaine with epinephrine. The chest was   prepped and draped.      Attention was turned to the right breast, where the lateral mastectomy   scar was incised. The expander was drained and removed. Periprosthetic capsulotomy was performed along the entire lower pole   with cautery. The wound was irrigated. A saline sizer was inserted and   filled with 800 mL of sterile IV saline. Attention was turned to the left breast, where a vertical mastopexy was   designed. The vertical pattern was de-epithelialized. A chest wall based   flap was designed and incised, including the entirety of the lower pole   breast tissue within the vertical pattern. The flap was mobilized. The   breast was undermined superiorly approximately 10 cm. The flap was   then advanced superiorly and secured to the chest wall with 2-0 PDS. This in effect gave her an auto-augmentation. The vertical pillars were   then approximated with 2-0 PDS, thereby, effectively coning the   breast. The nipple was secured into its new position with 3-0 Vicryl. The remaining incisions were closed in layers with absorbable staples   and sutures. The patient was placed in the seated position, and shape and   symmetry were found to be satisfactory. She was returned to the   supine position. The sizer was removed, and the pocket was irrigated   with 1 liter of saline and triple antibiotic solution. An 800 mL moderate   plus profile gel implant was then inserted using the Holloway funnel and   no-touch technique. Final incisions were closed in layers with   absorbable suture. Dermabond and dry dressings were applied, along   with a surgical bra. She was taken to the recovery room in good   condition after extubation.         Melissa Flores MD        / AARTI   D:  05/19/2017   11:11   T:  05/19/2017   13:23   Job #:  [id number]

## 2017-05-19 NOTE — ANESTHESIA PREPROCEDURE EVALUATION
Anesthetic History   No history of anesthetic complications            Review of Systems / Medical History  Patient summary reviewed, nursing notes reviewed and pertinent labs reviewed    Pulmonary  Within defined limits                 Neuro/Psych   Within defined limits           Cardiovascular  Within defined limits                Exercise tolerance: >4 METS     GI/Hepatic/Renal  Within defined limits              Endo/Other        Cancer     Other Findings              Physical Exam    Airway  Mallampati: II  TM Distance: 4 - 6 cm  Neck ROM: normal range of motion   Mouth opening: Normal     Cardiovascular  Regular rate and rhythm,  S1 and S2 normal,  no murmur, click, rub, or gallop  Rhythm: regular  Rate: normal         Dental  No notable dental hx       Pulmonary  Breath sounds clear to auscultation               Abdominal  GI exam deferred       Other Findings            Anesthetic Plan    ASA: 2  Anesthesia type: general          Induction: Intravenous  Anesthetic plan and risks discussed with: Patient

## 2017-05-19 NOTE — DISCHARGE INSTRUCTIONS
May remove dressings in 24hrs and shower. Wear bra at all times unless showering. DISCHARGE SUMMARY from Nurse    The following personal items are in your possession at time of discharge:    Dental Appliances: None  Visual Aid: Contacts  Hearing Aids/Status: Does not own  Home Medications: None  Jewelry: None  Clothing: Undergarments, Pants, Shirt, Footwear, Socks  Other Valuables: None             PATIENT INSTRUCTIONS:    After general anesthesia or intravenous sedation, for 24 hours or while taking prescription Narcotics:  · Limit your activities  · Do not drive and operate hazardous machinery  · Do not make important personal or business decisions  · Do  not drink alcoholic beverages  · If you have not urinated within 8 hours after discharge, please contact your surgeon on call. Report the following to your surgeon:  · Excessive pain, swelling, redness or odor of or around the surgical area  · Temperature over 100.5  · Nausea and vomiting lasting longer than 4 hours or if unable to take medications  · Any signs of decreased circulation or nerve impairment to extremity: change in color, persistent  numbness, tingling, coldness or increase pain  · Any questions        What to do at Home:  Please carry medication information at all times in case of emergency situations. A common side effect of anesthesia following surgery is nausea and/or vomiting. In order to decrease symptoms, it is wise to avoid foods that are high in fat, greasy foods, milk products, and spicy foods for the first 24 hours. Acceptable foods for the first 24 hours following surgery include but are not limited to:     soup   broth    toast    crackers    applesauce    bananas    mashed potatoes,   soft or scrambled eggs   oatmeal    jello    It is important to eat when taking your pain medication. This will help to prevent nausea. If possible, please try to time your meals with your medications.     It is very important to stay hydrated following surgery. Sip fluids frequently while awake. Avoid acidic drinks such as citrus juices and soda for 24 hours. Carbonated beverages may cause bloating and gas. Acceptable fluids include:    - water (flavor packets may add variety)  - coffee or tea (in moderation)  - Gatorade  - Bob-aid  - apple juice  - cranberry juice    You are encouraged to cough and deep breathe every hour when awake. This will help to prevent respiratory complications following anesthesia. You may want to hug a pillow when coughing and sneezing to add additional support to the surgical area and to decrease discomfort if you had abdominal or chest surgery. If you are discharged home with support stockings, you may remove them after 24 hours. Support stockings are used to help prevent blood clots in the legs following surgery. Please take time to review all of your Home Care Instructions and Medication Information sheets provided in your discharge packet. If you have any questions, please contact your surgeons office. Thank you. How to Care for Your Wound After Its Treated With  DERMABOND* Topical Skin Adhesive  DERMABOND* Topical Skin Adhesive (2-octyl cyanoacrylate) is a sterile, liquid skin adhesive  that holds wound edges together. The film will usually remain in place for 5 to 10 days, then  naturally fall off your skin. The following will answer some of your questions and provide instructions for proper care for your  wound while it is healing:    CHECK WOUND APPEARANCE   Some swelling, redness, and pain are common with all wounds and normally will go away as the  wound heals. If swelling, redness, or pain increases or if the wound feels warm to the touch,  contact a doctor. Also contact a doctor if the wound edges reopen or separate. REPLACE BANDAGES   If your wound is bandaged, keep the bandage dry.    Replace the dressing daily until the adhesive film has fallen off or if the  bandage should become wet, unless otherwise instructed by your  physician.  When changing the dressing, do not place tape directly over the  DERMABOND adhesive film, because removing the tape later may also  remove the film. AVOID TOPICAL MEDICATIONS   Do not apply liquid or ointment medications or any other product to your wound while the  DERMABOND adhesive film is in place. These may loosen the film before your wound is healed. KEEP WOUND DRY AND PROTECTED   You may occasionally and briefly wet your wound in the shower or bath. Do not soak or scrub  your wound, do not swim, and avoid periods of heavy perspiration until the DERMABOND  adhesive has naturally fallen off. After showering or bathing, gently blot your wound dry with a  soft towel. If a protective dressing is being used, apply a fresh, dry bandage, being sure to keep  the tape off the DERMABOND adhesive film.  Apply a clean, dry bandage over the wound if necessary to protect it.  Protect your wound from injury until the skin has had sufficient time to heal.   Do not scratch, rub, or pick at the DERMABOND adhesive film. This may loosen the film before  your wound is healed.  Protect the wound from prolonged exposure to sunlight or tanning lamps while the film is in  place. If you have any questions or concerns about this product, please consult your doctor. *Trademark ©[employer] 2002Hydrocodone/Acetaminophen (By mouth)   Acetaminophen (p-dyko-t-MIN-oh-fen), Hydrocodone Bitartrate (kyb-dnzm-DPP-done bye-TAR-trate)  Treats pain. This medicine contains a narcotic pain reliever. Brand Name(s): Hycet, Lorcet, Lorcet HD, Lorcet Plus, Lortab 10/325, Lortab 5/325, Lortab 7.5/325, Lortab Elixir, Norco, Verdrocet, Vicodin, Vicodin ES, Vicodin HP, Xodol, Xodol 5/300   There may be other brand names for this medicine. When This Medicine Should Not Be Used: This medicine is not right for everyone.  Do not use it if you had an allergic reaction to acetaminophen, hydrocodone, or other narcotic medicines, or stomach or bowel blockage (including paralytic ileus). How to Use This Medicine:   Capsule, Liquid, Tablet  · Your doctor will tell you how much medicine to use. Do not use more than directed. · An overdose can be dangerous. Follow directions carefully so you do not get too much medicine at one time. · Oral liquid: Measure the oral liquid medicine with a marked measuring spoon, oral syringe, or medicine cup. · Drink plenty of liquids to help avoid constipation. · This medicine should come with a Medication Guide. Ask your pharmacist for a copy if you do not have one. · Missed dose: Take a dose as soon as you remember. If it is almost time for your next dose, wait until then and take a regular dose. Do not take extra medicine to make up for a missed dose. · Store the medicine in a closed container at room temperature, away from heat, moisture, and direct light. Flush any unused Norco® tablets down the toilet. Drugs and Foods to Avoid:   Ask your doctor or pharmacist before using any other medicine, including over-the-counter medicines, vitamins, and herbal products. · Do not use this medicine if you are using or have used an MAO inhibitor within the past 14 days. · Some medicines can affect how hydrocodone/acetaminophen works. Tell your doctor if you are using any of the following:   ¨ Carbamazepine, erythromycin, ketoconazole, mirtazapine, phenytoin, rifampin, ritonavir, tramadol, trazodone  ¨ Diuretic (water pill)  ¨ Medicine to treat depression or mental health problems  ¨ Medicine to treat migraine headaches  ¨ Phenothiazine medicine  · Tell your doctor if you use anything else that makes you sleepy. Some examples are allergy medicine, narcotic pain medicine, and alcohol. Tell your doctor if you are using buprenorphine, butorphanol, nalbuphine, pentazocine, or a muscle relaxer. · Do not drink alcohol while you are using this medicine. Acetaminophen can damage your liver, and your risk is higher if you also drink alcohol. Warnings While Using This Medicine:   · Tell your doctor if you are pregnant or breastfeeding, or if you have kidney disease, liver disease, lung or breathing problems, gallbladder or pancreas problems, an underactive thyroid, Morehouse disease, prostate problems, trouble urinating, stomach problems, or a history of head injury or brain tumor, seizures, alcohol or drug addiction. · This medicine may cause the following problems:   ¨ High risk of overdose, which can lead to death  ¨ Respiratory depression (serious breathing problem that can be life-threatening)  ¨ Liver problems  ¨ Serious skin reactions  ¨ Serotonin syndrome (when used with certain medicines)  · This medicine can be habit-forming. Do not use more than your prescribed dose. Call your doctor if you think your medicine is not working. · This medicine may make you dizzy or drowsy. Do not drive or doing anything else that could be dangerous until you know how this medicine affects you. · This medicine contains acetaminophen. Read the labels of all other medicines you are using to see if they also contain acetaminophen, or ask your doctor or pharmacist. Corky Hong not use more than 4 grams (4,000 milligrams) total of acetaminophen in one day. · Tell any doctor or dentist who treats you that you are using this medicine. This medicine may affect certain medical test results. · This medicine may cause constipation, especially with long-term use. Ask your doctor if you should use a laxative to prevent and treat constipation. · This medicine could cause infertility. Talk with your doctor before using this medicine if you plan to have children. · Keep all medicine out of the reach of children. Never share your medicine with anyone.   Possible Side Effects While Using This Medicine:   Call your doctor right away if you notice any of these side effects:  · Allergic reaction: Itching or hives, swelling in your face or hands, swelling or tingling in your mouth or throat, chest tightness, trouble breathing  · Anxiety, restlessness, fast heartbeat, fever, sweating, muscle spasms, twitching, diarrhea, seeing or hearing things that are not there  · Blistering, peeling, red skin rash  · Blue lips, fingernails, or skin  · Dark urine or pale stools, loss of appetite, nausea or vomiting, stomach pain, yellow skin or eyes  · Extreme weakness, shallow breathing, slow heartbeat, sweating, seizures, cold or clammy skin  · Lightheadedness, dizziness, fainting  If you notice these less serious side effects, talk with your doctor:   · Constipation, nausea, vomiting  · Tiredness or sleepiness  If you notice other side effects that you think are caused by this medicine, tell your doctor. Call your doctor for medical advice about side effects. You may report side effects to FDA at 6-124-FDA-6030  © 2017 [facility] Information is for End User's use only and may not be sold, redistributed or otherwise used for commercial purposes. The above information is an  only. It is not intended as medical advice for individual conditions or treatments. Talk to your doctor, nurse or pharmacist before following any medical regimen to see if it is safe and effective for you. These are general instructions for a healthy lifestyle:    No smoking/ No tobacco products/ Avoid exposure to second hand smoke    Surgeon General's Warning:  Quitting smoking now greatly reduces serious risk to your health.     Obesity, smoking, and sedentary lifestyle greatly increases your risk for illness    A healthy diet, regular physical exercise & weight monitoring are important for maintaining a healthy lifestyle    You may be retaining fluid if you have a history of heart failure or if you experience any of the following symptoms:  Weight gain of 3 pounds or more overnight or 5 pounds in a week, increased swelling in our hands or feet or shortness of breath while lying flat in bed. Please call your doctor as soon as you notice any of these symptoms; do not wait until your next office visit. Recognize signs and symptoms of STROKE:    F-face looks uneven    A-arms unable to move or move unevenly    S-speech slurred or non-existent    T-time-call 911 as soon as signs and symptoms begin-DO NOT go       Back to bed or wait to see if you get better-TIME IS BRAIN. Warning Signs of HEART ATTACK     Call 911 if you have these symptoms:   Chest discomfort. Most heart attacks involve discomfort in the center of the chest that lasts more than a few minutes, or that goes away and comes back. It can feel like uncomfortable pressure, squeezing, fullness, or pain.  Discomfort in other areas of the upper body. Symptoms can include pain or discomfort in one or both arms, the back, neck, jaw, or stomach.  Shortness of breath with or without chest discomfort.  Other signs may include breaking out in a cold sweat, nausea, or lightheadedness. Don't wait more than five minutes to call 911 - MINUTES MATTER! Fast action can save your life. Calling 911 is almost always the fastest way to get lifesaving treatment. Emergency Medical Services staff can begin treatment when they arrive -- up to an hour sooner than if someone gets to the hospital by car. The discharge information has been reviewed with the patient and caregiver. The patient and caregiver verbalized understanding. Discharge medications reviewed with the patient and caregiver and appropriate educational materials and side effects teaching were provided.

## 2017-05-19 NOTE — PERIOP NOTES
Handoff Report from Operating Room to PACU    Report received from MO Porter RN and Jennifer Odonnell CRNA regarding Jennifer Blankenship. Surgeon(s):  Shahida Harper MD  And Procedure(s) (LRB):  EXCHANGE RIGHT TISSUE EXPANDER FOR IMPLANT, LEFT MASTOPEXY (Right)  BREAST MASTOPEXY (Left)  confirmed   with allergies and dressings discussed. Anesthesia type, drugs, patient history, complications, estimated blood loss, vital signs, intake and output, and last pain medication, lines, reversal medications and temperature were reviewed.

## 2017-05-19 NOTE — PERIOP NOTES
TRANSFER - OUT REPORT:    Verbal report given to [facility], RN on Catalina Cesar 40  being transferred to Phase II for routine post - op       Report consisted of patients Situation, Background, Assessment and   Recommendations(SBAR). Information from the following report(s) SBAR, Kardex, OR Summary, Intake/Output and MAR was reviewed with the receiving nurse. Opportunity for questions and clarification was provided.       Patient transported with:   Registered Nurse

## 2017-05-19 NOTE — BRIEF OP NOTE
BRIEF OPERATIVE NOTE    Date of Procedure: 5/19/2017   Preoperative Diagnosis: BREAST CANCER  Postoperative Diagnosis: BREAST CANCER    Procedure(s):  EXCHANGE RIGHT TISSUE EXPANDER FOR IMPLANT, LEFT MASTOPEXY ([place] 800ml MP)  Surgeon(s) and Role:     * Ligia Brunson MD - Primary         Assistant Staff:       Surgical Staff:  Circ-1: Devante Barraza RN  Scrub RN-1: Candelario Heck RN  Surg Asst-1: Gisele Chavez  Event Time In   Incision Start 9140   Incision Close 0923     Anesthesia: General   Estimated Blood Loss: 20ml  Specimens: * No specimens in log *   Findings: see note   Complications: none  Implants:   Implant Name Type Inv.  Item Serial No.  Lot No. LRB No. Used Action   IMPL BRST RND MP+ GEL 800ML --  - [id number]   IMPL BRST RND MP+ GEL 800ML --  [id number] [place] NA Right 1 Implanted

## 2017-08-03 ENCOUNTER — HOSPITAL ENCOUNTER (OUTPATIENT)
Dept: ULTRASOUND IMAGING | Age: 57
Discharge: HOME | End: 2017-08-03
Attending: FAMILY MEDICINE
Payer: COMMERCIAL

## 2017-08-03 DIAGNOSIS — N95.0 POST-MENOPAUSAL BLEEDING: ICD-10-CM

## 2017-08-03 PROCEDURE — 76830 TRANSVAGINAL US NON-OB: CPT

## 2017-08-03 PROCEDURE — 76856 US EXAM PELVIC COMPLETE: CPT

## 2017-08-18 ENCOUNTER — OFFICE VISIT (OUTPATIENT)
Dept: SURGERY | Age: 57
End: 2017-08-18

## 2017-08-18 VITALS
DIASTOLIC BLOOD PRESSURE: 95 MMHG | WEIGHT: 195 LBS | OXYGEN SATURATION: 98 % | HEIGHT: 65 IN | HEART RATE: 73 BPM | BODY MASS INDEX: 32.49 KG/M2 | SYSTOLIC BLOOD PRESSURE: 148 MMHG

## 2017-08-18 DIAGNOSIS — C50.411 BREAST CANCER OF UPPER-OUTER QUADRANT OF RIGHT FEMALE BREAST (HCC): Primary | ICD-10-CM

## 2017-08-18 NOTE — PROGRESS NOTES
HISTORY OF PRESENT ILLNESS  Jb Dugan is a 60 y.o. female who comes in for breast cancer follow up  Breast Cancer   Pertinent negatives include no chest pain, no abdominal pain, no headaches and no shortness of breath. She went for screening mammogram and was noted to have a right breast calcificaitons at 1200. She had a dx mammogram and US demonstrating a 6 mm lesion but also surrounding microcalcifications 2 cm away. US core of the lesion 11/15/2016 demonstrated IDC G2 % NV 15% her2/susy 0, and Ki67 30%. She previously had had a right breast biopsy for \"precancerous cells\" in 2003. She denies feeling any lumps, skin changes, nipple changes or drainage, unexplained weight loss or bone pain. She had menarche at 4, first of two pregnancies at 27, menopause at 44 and did not take HRT. She reports M with breast ca at 68, MGA with breast ca in 60s. She underwent preop breast MRI demonstrating more extensive disease. 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 She has completed reconstruction and had a left reduction/symmetry procedure.   She is now taking letrozole    Past Medical History:   Diagnosis Date    Cancer [facility]) 2016    breast    Ill-defined condition     bronchitis hx     Past Surgical History:   Procedure Laterality Date    BREAST SURGERY PROCEDURE UNLISTED      right breast biopsy benign    HX BREAST RECONSTRUCTION Right 1/13/2017    RIGHT BREAST RECONSTRUCTION WITH TISSUE EXPANDER AND ALLODERM performed by Terra Dumont MD at [facility] MAIN OR    HX BREAST RECONSTRUCTION Right 5/19/2017    EXCHANGE RIGHT TISSUE EXPANDER FOR IMPLANT, LEFT MASTOPEXY performed by Terra Dumont MD at [facility] MA[facility]    HX BREAST REDUCTION Left 5/19/2017    BREAST MASTOPEXY performed by Terra Dumont MD at [facility] MA[facility] OR     [facility]    HX [facility]    HX MASTECTOMY Right 1/13/2017    RIGHT BREAST SKIN SPARING MASTECTOMY AND SENTINEL LYMPH NODE BIOPSY/AXILLARY NODE DISSECTION performed by Mis Tam MD at [facility] MAIN OR    HX ORTHOPAEDIC  5/2/12    open reduction and internal fixation right distal radius     Family History   Problem Relation Age of Onset    Hypertension Mother     Cancer Mother      breast bladder    Cancer Father      liver     Social History   Substance Use Topics    Smoking status: Never Smoker    Smokeless tobacco: Never Used    Alcohol use 0.6 oz/week     1 Shots of liquor per week      Comment: very rare--once or twice a year     Current Outpatient Prescriptions   Medication Sig    IBANDRONATE SODIUM (BONIVA PO) Take  by mouth.  letrozole (FEMARA) 2.5 mg tablet Take 2.5 mg by mouth daily.  HYDROcodone-acetaminophen (NORCO) 5-325 mg per tablet Take 1-2 Tabs by mouth every four (4) hours as needed for Pain. Max Daily Amount: 12 Tabs. No current facility-administered medications for this visit. No Known Allergies    Review of Systems   Constitutional: Negative for chills, diaphoresis, fever, malaise/fatigue and weight loss. HENT: Negative for congestion, ear pain and sore throat. Eyes: Negative for blurred vision and pain. Respiratory: Negative for cough, hemoptysis, sputum production, shortness of breath, wheezing and stridor. Cardiovascular: Negative for chest pain, palpitations, orthopnea, claudication, leg swelling and PND. Gastrointestinal: Negative for abdominal pain, blood in stool, constipation, diarrhea, heartburn, melena, nausea and vomiting. Genitourinary: Negative for dysuria, flank pain, frequency, hematuria and urgency. Musculoskeletal: Negative for back pain, joint pain, myalgias and neck pain. Skin: Negative for itching and rash. Neurological: Negative for dizziness, tremors, focal weakness, seizures, weakness and headaches. Endo/Heme/Allergies: Negative for polydipsia.    Psychiatric/Behavioral: Negative for depression and memory loss. The patient is not nervous/anxious. Visit Vitals    BP (!) 148/95    Pulse 73    Ht 5' 5\" (1.651 m)    Wt 88.5 kg (195 lb)    SpO2 98%    BMI 32.45 kg/m2       Physical Exam   Constitutional: She is oriented to person, place, and time. She appears well-developed and well-nourished. No distress. HENT:   Head: Normocephalic and atraumatic. Mouth/Throat: Oropharynx is clear and moist. No oropharyngeal exudate. Eyes: Conjunctivae and EOM are normal. Pupils are equal, round, and reactive to light. No scleral icterus. Neck: Normal range of motion. Neck supple. No JVD present. No tracheal deviation present. No thyromegaly present. Cardiovascular: Normal rate and regular rhythm. Exam reveals no gallop and no friction rub. No murmur heard. Pulmonary/Chest: Effort normal and breath sounds normal. No respiratory distress. She has no wheezes. She has no rales. Right breast exhibits tenderness. Right breast exhibits no inverted nipple, no mass, no nipple discharge and no skin change. Left breast exhibits no inverted nipple, no mass, no nipple discharge, no skin change and no tenderness. Breasts are symmetrical (med). Abdominal: Soft. Bowel sounds are normal. She exhibits no distension and no mass. There is no tenderness. There is no rebound and no guarding. Musculoskeletal: Normal range of motion. She exhibits no edema. Lymphadenopathy:     She has no cervical adenopathy. She has no axillary adenopathy. Right: No supraclavicular adenopathy present. Left: No supraclavicular adenopathy present. Neurological: She is alert and oriented to person, place, and time. No cranial nerve deficit. Skin: Skin is warm and dry. No rash noted. She is not diaphoretic. No erythema. No pallor. Psychiatric: She has a normal mood and affect. Her behavior is normal. Judgment and thought content normal.       ASSESSMENT and PLAN  1.   Right breast cancer stage 1 ([id number])  Dx 11/2017:  STACI at 8 months  Left mammogram 12/2017  Continue letrozole    RTC 6 months      Etienne Suresh MD FACS

## 2017-08-18 NOTE — MR AVS SNAPSHOT
Visit Information Date & Time Provider Department Dept. Phone Encounter #  
 8/18/2017  8:40 AM Mis Tam MD [facility] [id number] Your Appointments 2/5/2018  8:00 AM  
ESTABLISHED PATIENT with Mis Tam MD  
[facility] Dr. Dang Akikojonathankailey [facility] ([address]) Appt Note: 6 month breast check [address] [facility] [address], [address] [address]  
[address], [address] [facility], [address] [place] [address] [address] [address] Upcoming Health Maintenance Date Due Hepatitis C Screening 1960 Pneumococcal 19-64 Highest Risk (1 of 3 - PCV13) 10/24/1977 DTaP/Tdap/Td series (1 - Tdap) 10/24/1979 PAP AKA CERVICAL CYTOLOGY 10/24/1979 BREAST CANCER SCRN MAMMOGRAM 10/24/2008 FOBT Q 1 YEAR AGE 50-75 10/24/2008 INFLUENZA AGE 9 TO ADULT 8/1/2017 Allergies as of 8/18/2017  Review Complete On: 8/18/2017 By: Mis Tam MD  
 No Known Allergies Current Immunizations  Never Reviewed Name Date Influenza Vaccine 10/1/2016 Not reviewed this visit You Were Diagnosed With   
  
 Codes Comments Breast cancer of upper-outer quadrant of right female breast ([facility] [address]    -  Primary ICD-10-CM: V82.204 ICD-9-CM: 174.4 [id number] Vitals BP Pulse Height(growth percentile) Weight(growth percentile) SpO2 BMI  
 (!) 148/95 73 5' 5\" (1.651 m) 195 lb (88.5 kg) 98% 32.45 kg/m2 OB Status Smoking Status Postmenopausal Never Smoker BMI and BSA Data Body Mass Index Body Surface Area  
 32.45 kg/m 2 2.01 m 2 Preferred Pharmacy Pharmacy Name Phone [id number] [address] [phone] Your Updated Medication List  
  
   
This list is accurate as of: 8/18/17  9:25 AM.  Always use your most recent med list.  
  
  
  
  
 Malen Parents  
 Take  by mouth. HYDROcodone-acetaminophen 5-325 mg per tablet Commonly known as:  Billye Chamois Take 1-2 Tabs by mouth every four (4) hours as needed for Pain. Max Daily Amount: 12 Tabs. letrozole 2.5 mg tablet Commonly known as:  [facility] Take 2.5 mg by mouth daily. To-Do List   
 11/09/2017 8:30 AM  
  Appointment with [facility] 2 at [address] [place] [address] ([phone]) Shower or bathe using soap and water. Do not use deodorant, powder, perfumes, or lotion the day of your exam.  If your prior mammograms were not performed at [facility] please bring films with you or forward prior images 2 days before your procedure. Check in at registration 15min before your appointment time unless you were instructed to do otherwise. A script is not necessary for screening. If you have one, please bring it on the day of the mammogram or have it faxed to the department. [facility]  [phone] [address] [place] [address] [phone] [facility] [phone] [facility] [phone]  
  
 11/13/2017 Imaging:  [facility] 3D [place] W MAMMO LT DX INCL CAD Introducing \[facility] [place] [facility]\"" & HEALTH SERVICES! Sherif Evans introduces [employer] patient portal. Now you can access parts of your medical record, email your doctor's office, and request medication refills online. 1. In your internet browser, go to [email or web address] 2. Click on the First Time User? Click Here link in the Sign In box. You will see the New Member Sign Up page. 3. Enter your Iggli Access Code exactly as it appears below. You will not need to use this code after youve completed the sign-up process. If you do not sign up before the expiration date, you must request a new code. · Iggli Access Code: [email or web address] Expires: 11/16/2017  8:24 AM 
 
4. Enter the last four digits of your Social Security Number (xxxx) and Date of Birth (mm/dd/yyyy) as indicated and click Submit. You will be taken to the next sign-up page. 5. Create a F[employer] ID. This will be your [employer] login ID and cannot be changed, so think of one that is secure and easy to remember. 6. Create a [employer] password. You can change your password at any time. 7. Enter your Password Reset Question and Answer. This can be used at a later time if you forget your password. 8. Enter your e-mail address. You will receive e-mail notification when new information is available in [address] 9. Click Sign Up. You can now view and download portions of your medical record. 10. Click the Download Summary menu link to download a portable copy of your medical information. If you have questions, please visit the Frequently Asked Questions section of the [employer] website. Remember, [employer] is NOT to be used for urgent needs. For medical emergencies, dial 911. Now available from your iPhone and Android! Please provide this summary of care documentation to your next provider. Your primary care clinician is listed as [facility]. If you have any questions after today's visit, please call [phone].

## 2017-11-09 ENCOUNTER — HOSPITAL ENCOUNTER (OUTPATIENT)
Dept: MAMMOGRAPHY | Age: 57
Discharge: HOME | End: 2017-11-09
Attending: SURGERY
Payer: COMMERCIAL

## 2017-11-09 DIAGNOSIS — C50.411 BREAST CANCER OF UPPER-OUTER QUADRANT OF RIGHT FEMALE BREAST (HCC): ICD-10-CM

## 2017-11-09 PROCEDURE — 77061 BREAST TOMOSYNTHESIS UNI: CPT

## 2018-02-05 ENCOUNTER — OFFICE VISIT (OUTPATIENT)
Dept: SURGERY | Age: 58
End: 2018-02-05

## 2018-02-05 VITALS
SYSTOLIC BLOOD PRESSURE: 152 MMHG | BODY MASS INDEX: 32.32 KG/M2 | HEIGHT: 65 IN | WEIGHT: 194 LBS | DIASTOLIC BLOOD PRESSURE: 87 MMHG | OXYGEN SATURATION: 99 % | HEART RATE: 85 BPM

## 2018-02-05 DIAGNOSIS — Z17.0 MALIGNANT NEOPLASM OF UPPER-OUTER QUADRANT OF RIGHT BREAST IN FEMALE, ESTROGEN RECEPTOR POSITIVE (HCC): Primary | ICD-10-CM

## 2018-02-05 DIAGNOSIS — C50.411 MALIGNANT NEOPLASM OF UPPER-OUTER QUADRANT OF RIGHT BREAST IN FEMALE, ESTROGEN RECEPTOR POSITIVE (HCC): Primary | ICD-10-CM

## 2018-02-05 NOTE — PROGRESS NOTES
HISTORY OF PRESENT ILLNESS  Lito Brown is a 59 y.o. female who comes in for breast cancer follow up  Breast Cancer   Pertinent negatives include no chest pain, no abdominal pain, no headaches and no shortness of breath. She went for screening mammogram and was noted to have a right breast calcificaitons at 1200. She had a dx mammogram and US demonstrating a 6 mm lesion but also surrounding microcalcifications 2 cm away. US core of the lesion 11/15/2016 demonstrated IDC G2 % WA 15% her2/susy 0, and Ki67 30%. She previously had had a right breast biopsy for \"precancerous cells\" in 2003. She denies feeling any lumps, skin changes, nipple changes or drainage, unexplained weight loss or bone pain. She had menarche at 4, first of two pregnancies at 27, menopause at 44 and did not take HRT. She reports M with breast ca at 68, MGA with breast ca in 62s. She underwent preop breast MRI demonstrating more extensive disease. She elected a right mastectomy and SLNB and reconstruction Saadia Sanchez) 1/12/2017 which had a 2 mm focus of IDC and extensive DCIS. She has completed reconstruction and had a left reduction/symmetry procedure. She is now taking letrozole and tolerating it with some hot flashes and mild joint pains. She had a left 3D dx mammogram 11/2017 which was BIRADS 1.       Past Medical History:   Diagnosis Date    Breast cancer ([address] 2016    right mastectomy with implant reocntruction    Cancer ([address] 2016    breast    Ill-defined condition     bronchitis hx     Past Surgical History:   Procedure Laterality Date    BREAST SURGERY PROCEDURE UNLISTED      right breast biopsy benign    HX BREAST RECONSTRUCTION Right 1/13/2017    RIGHT BREAST RECONSTRUCTION WITH TISSUE EXPANDER AND ALLODERM performed by Bucky Cook MD at [facility] MAIN OR    HX BREAST RECONSTRUCTION Right 5/19/2017    EXCHANGE RIGHT TISSUE EXPANDER FOR IMPLANT, LEFT MASTOPEXY performed by Bucky Cook MD at [facility] MAIN OR  HX BREAST REDUCTION Left 5/19/2017    BREAST MASTOPEXY performed by Lisa Lozada MD at [facility] MAIN OR    HX [address] HX MASTECTOMY Right 1/13/2017    RIGHT BREAST SKIN SPARING MASTECTOMY AND SENTINEL LYMPH NODE BIOPSY/AXILLARY NODE DISSECTION performed by Kenia Armijo MD at [facility] MAIN OR    HX ORTHOPAEDIC  5/2/12    open reduction and internal fixation right distal radius     Family History   Problem Relation Age of Onset    Hypertension Mother     Cancer Mother      breast bladder    Breast Cancer Mother 67    Cancer Father      liver     Social History   Substance Use Topics    Smoking status: Never Smoker    Smokeless tobacco: Never Used    Alcohol use 0.6 oz/week     1 Shots of liquor per week      Comment: very rare--once or twice a year     Current Outpatient Prescriptions   Medication Sig    IBANDRONATE SODIUM (BONIVA PO) Take  by mouth.  letrozole (FEMARA) 2.5 mg tablet Take 2.5 mg by mouth daily. No current facility-administered medications for this visit. No Known Allergies    Review of Systems   Constitutional: Negative for chills, diaphoresis, fever, malaise/fatigue and weight loss. HENT: Negative for congestion, ear pain and sore throat. Eyes: Negative for blurred vision and pain. Respiratory: Negative for cough, hemoptysis, sputum production, shortness of breath, wheezing and stridor. Cardiovascular: Negative for chest pain, palpitations, orthopnea, claudication, leg swelling and PND. Gastrointestinal: Negative for abdominal pain, blood in stool, constipation, diarrhea, heartburn, melena, nausea and vomiting. Genitourinary: Negative for dysuria, flank pain, frequency, hematuria and urgency. Musculoskeletal: Negative for back pain, joint pain, myalgias and neck pain. Skin: Negative for itching and rash.    Neurological: Negative for dizziness, tremors, focal weakness, seizures, weakness and headaches. Endo/Heme/Allergies: Negative for polydipsia. Psychiatric/Behavioral: Negative for depression and memory loss. The patient is not nervous/anxious. Visit Vitals    /87 (BP 1 Location: Left arm, BP Patient Position: Sitting)    Pulse 85    Ht 5' 5\" (1.651 m)    Wt 88 kg (194 lb)    SpO2 99%    BMI 32.28 kg/m2       Physical Exam   Constitutional: She is oriented to person, place, and time. She appears well-developed and well-nourished. No distress. HENT:   Head: Normocephalic and atraumatic. Mouth/Throat: Oropharynx is clear and moist. No oropharyngeal exudate. Eyes: Conjunctivae and EOM are normal. Pupils are equal, round, and reactive to light. No scleral icterus. Neck: Normal range of motion. Neck supple. No JVD present. No tracheal deviation present. No thyromegaly present. Cardiovascular: Normal rate and regular rhythm. Exam reveals no gallop and no friction rub. No murmur heard. Pulmonary/Chest: Effort normal and breath sounds normal. No respiratory distress. She has no wheezes. She has no rales. Right breast exhibits tenderness. Right breast exhibits no inverted nipple, no mass, no nipple discharge and no skin change. Left breast exhibits no inverted nipple, no mass, no nipple discharge, no skin change and no tenderness. Breasts are symmetrical (med). Abdominal: Soft. Bowel sounds are normal. She exhibits no distension and no mass. There is no tenderness. There is no rebound and no guarding. Musculoskeletal: Normal range of motion. She exhibits no edema. Lymphadenopathy:     She has no cervical adenopathy. She has no axillary adenopathy. Right: No supraclavicular adenopathy present. Left: No supraclavicular adenopathy present. Neurological: She is alert and oriented to person, place, and time. No cranial nerve deficit. Skin: Skin is warm and dry. No rash noted. She is not diaphoretic. No erythema. No pallor.    Psychiatric: She has a normal mood and affect. Her behavior is normal. Judgment and thought content normal.       ASSESSMENT and PLAN  1. Right breast cancer stage 1 ([id number])  Dx 11/2017:  STACI at 1 year and 3 months  Left mammogram 11/2018  Continue letrozole  2. Colon screening.   Scheduled for colonoscopy with Dr Elaina Conway in mid Feb\    RTC 6 months      Agustina Amanda MD FACS

## 2018-02-05 NOTE — MR AVS SNAPSHOT
[address], [address] 
[phone] Patient: Osmani Anderson MRN: [id number] ZSN:[id number] Visit Information Date & Time Provider Department Dept. Phone Encounter #  
 2/5/2018  8:00 AM Jesenia Doe MD [facility] [address] [phone] [id number] Upcoming Health Maintenance Date Due Hepatitis C Screening 1960 Pneumococcal 19-64 Highest Risk (1 of 3 - PCV13) 10/24/1977 DTaP/Tdap/Td series (1 - Tdap) 10/24/1979 PAP AKA CERVICAL CYTOLOGY 10/24/1979 FOBT Q 1 YEAR AGE 50-75 10/24/2008 Influenza Age 5 to Adult 8/1/2017 BREAST CANCER SCRN MAMMOGRAM 11/9/2019 Allergies as of 2/5/2018  Review Complete On: 2/5/2018 By: Jesenia Doe MD  
 No Known Allergies Current Immunizations  Never Reviewed Name Date Influenza Vaccine 10/1/2016 Not reviewed this visit Vitals BP Pulse Height(growth percentile) Weight(growth percentile) SpO2 BMI  
 152/87 (BP 1 Location: Left arm, BP Patient Position: Sitting) 85 5' 5\" (1.651 m) 194 lb (88 kg) 99% 32.28 kg/m2 OB Status Smoking Status Postmenopausal Never Smoker Vitals History BMI and BSA Data Body Mass Index Body Surface Area  
 32.28 kg/m 2 2.01 m 2 Preferred Pharmacy Pharmacy Name Phone [id number] - [place] [facility] [phone] Your Updated Medication List  
  
   
This list is accurate as of: 2/5/18  8:29 AM.  Always use your most recent med list.  
  
  
  
  
 Anthony Jiang Take  by mouth. letrozole 2.5 mg tablet Commonly known as:  [facility] Take 2.5 mg by mouth daily. Introducing [facility] [place] & HEALTH SERVICES!    
 [facility] introduces [employer] patient portal. Now you can access parts of your medical record, email your doctor's office, and request medication refills online. 1. In your internet browser, go to [email or web address] 2. Click on the First Time User? Click Here link in the Sign In box. You will see the New Member Sign Up page. 3. Enter your PayPay Access Code exactly as it appears below. You will not need to use this code after youve completed the sign-up process. If you do not sign up before the expiration date, you must request a new code. · PayPay Access Code: [email or web address] Expires: 5/6/2018  8:29 AM 
 
4. Enter the last four digits of your Social Security Number (xxxx) and Date of Birth (mm/dd/yyyy) as indicated and click Submit. You will be taken to the next sign-up page. 5. Create a PayPay ID. This will be your PayPay login ID and cannot be changed, so think of one that is secure and easy to remember. 6. Create a PayPay password. You can change your password at any time. 7. Enter your Password Reset Question and Answer. This can be used at a later time if you forget your password. 8. Enter your e-mail address. You will receive e-mail notification when new information is available in [address] 9. Click Sign Up. You can now view and download portions of your medical record. 10. Click the Download Summary menu link to download a portable copy of your medical information. If you have questions, please visit the Frequently Asked Questions section of the PayPay website. Remember, PayPay is NOT to be used for urgent needs. For medical emergencies, dial 911. Now available from your iPhone and Android! Please provide this summary of care documentation to your next provider. Your primary care clinician is listed as [facility]. If you have any questions after today's visit, please call [phone].

## 2018-08-08 ENCOUNTER — OFFICE VISIT (OUTPATIENT)
Dept: SURGERY | Age: 58
End: 2018-08-08

## 2018-08-08 VITALS
DIASTOLIC BLOOD PRESSURE: 69 MMHG | WEIGHT: 192.5 LBS | BODY MASS INDEX: 32.07 KG/M2 | HEART RATE: 88 BPM | TEMPERATURE: 98.6 F | SYSTOLIC BLOOD PRESSURE: 124 MMHG | HEIGHT: 65 IN | RESPIRATION RATE: 16 BRPM | OXYGEN SATURATION: 95 %

## 2018-08-08 DIAGNOSIS — C50.411 MALIGNANT NEOPLASM OF UPPER-OUTER QUADRANT OF RIGHT BREAST IN FEMALE, ESTROGEN RECEPTOR POSITIVE (HCC): Primary | ICD-10-CM

## 2018-08-08 DIAGNOSIS — Z12.31 ENCOUNTER FOR SCREENING MAMMOGRAM FOR HIGH-RISK PATIENT: ICD-10-CM

## 2018-08-08 DIAGNOSIS — Z17.0 MALIGNANT NEOPLASM OF UPPER-OUTER QUADRANT OF RIGHT BREAST IN FEMALE, ESTROGEN RECEPTOR POSITIVE (HCC): Primary | ICD-10-CM

## 2018-08-08 NOTE — PROGRESS NOTES
Chief Complaint   Patient presents with    Breast Cancer     6m breast check. 1. Have you been to the ER, urgent care clinic since your last visit? Hospitalized since your last visit? No    2. Have you seen or consulted any other health care providers outside of the [address] [place] [address] since your last visit? Include any pap smears or colon screening.  No

## 2018-08-08 NOTE — MR AVS SNAPSHOT
[address], [address] [facility], [address] [place] [id number] Patient: Dagmar Chan MRN: [id number] [facility]:85/16/8204 Visit Information Date & Time Provider Department Dept. Phone Encounter #  
 8/8/2018  3:20 PM Destiney Valdez MD [facility] [id number] Your Appointments 2/13/2019  3:20 PM  
ESTABLISHED PATIENT with Destiney Valdez MD  
[facility] Dr. Alton Patten ([facility]) Appt Note: 6 month breast check [facility] [address], [address] [address]  
[address], [address] [facility], [address] [place] [address] [address] [address] Upcoming Health Maintenance Date Due Hepatitis C Screening 1960 Pneumococcal 19-64 Highest Risk (1 of 3 - PCV13) 10/24/1977 DTaP/Tdap/Td series (1 - Tdap) 10/24/1979 PAP AKA CERVICAL CYTOLOGY 10/24/1979 FOBT Q 1 YEAR AGE 50-75 10/24/2008 Influenza Age 5 to Adult 8/1/2018 BREAST CANCER SCRN MAMMOGRAM 11/9/2019 Allergies as of 8/8/2018  Review Complete On: 8/8/2018 By: Destiney Valdez MD  
 No Known Allergies Current Immunizations  Never Reviewed Name Date Influenza Vaccine 10/1/2016 Not reviewed this visit You Were Diagnosed With   
  
 Codes Comments Malignant neoplasm of upper-outer quadrant of right breast in female, estrogen receptor positive ([facility] [address]    -  Primary ICD-10-CM: C50.411, Z17.0 ICD-9-CM: 174.4, V86.0 Encounter for screening mammogram for high-risk patient     ICD-10-CM: Z12.31 
ICD-9-CM: V76.11 Vitals BP Pulse Temp Resp Height(growth percentile) Weight(growth percentile) 124/69 (BP 1 Location: Left arm, BP Patient Position: Sitting) 88 98.6 °F (37 °C) (Oral) 16 5' 5\" (1.651 m) 192 lb 8 oz (87.3 kg) SpO2 BMI OB Status Smoking Status 95% 32.03 kg/m2 Postmenopausal Never Smoker Vitals History BMI and BSA Data Body Mass Index Body Surface Area 32.03 kg/m 2 2 m 2 Preferred Pharmacy [facility] [address] [phone] Your Updated Medication List  
  
   
This list is accurate as of 8/8/18  3:30 PM.  Always use your most recent med list.  
  
  
  
  
 Carissa Jordan Take 1 Tab by mouth every thirty (30) days. letrozole 2.5 mg tablet Commonly known as:  [facility] Take 2.5 mg by mouth daily. To-Do List   
 11/12/2018 Imaging:  [facility] 3D [place] W MAMMO LT SCREENING INCL CAD Introducing [place] [facility] & HEALTH SERVICES! [employer] introduces [employer] patient portal. Now you can access parts of your medical record, email your doctor's office, and request medication refills online. 1. In your internet browser, go to [email or web address]://"[email or web address]. Oppex/"[employer] 2. Click on the First Time User? Click Here link in the Sign In box. You will see the New Member Sign Up page. 3. Enter your [employer] Access Code exactly as it appears below. You will not need to use this code after youve completed the sign-up process. If you do not sign up before the expiration date, you must request a new code. · [employer] Access Code: [email or web address] Expires: 11/6/2018  3:04 PM 
 
4. Enter the last four digits of your Social Security Number (xxxx) and Date of Birth (mm/dd/yyyy) as indicated and click Submit. You will be taken to the next sign-up page. 5. Create a Digital Fortresst ID. This will be your [employer] login ID and cannot be changed, so think of one that is secure and easy to remember. 6. Create a [employer] password. You can change your password at any time. 7. Enter your Password Reset Question and Answer. This can be used at a later time if you forget your password. 8. Enter your e-mail address. You will receive e-mail notification when new information is available in [address] 9. Click Sign Up. You can now view and download portions of your medical record. 10. Click the Download Summary menu link to download a portable copy of your medical information. If you have questions, please visit the Frequently Asked Questions section of the [facility] website. Remember, [facility] is NOT to be used for urgent needs. For medical emergencies, dial 911. Now available from your iPhone and Android! Please provide this summary of care documentation to your next provider. Your primary care clinician is listed as [facility]. If you have any questions after today's visit, please call [phone].

## 2018-08-08 NOTE — PROGRESS NOTES
HISTORY OF PRESENT ILLNESS  Elizabeth Trotter is a 59 y.o. female who comes in for breast cancer follow up  Breast Cancer   Pertinent negatives include no chest pain, no abdominal pain, no headaches and no shortness of breath. She went for screening mammogram and was noted to have a right breast calcificaitons at 1200. She had a dx mammogram and US demonstrating a 6 mm lesion but also surrounding microcalcifications 2 cm away. US core of the lesion 11/15/2016 demonstrated IDC G2 % IN 15% her2/susy 0, and Ki67 30%. She previously had had a right breast biopsy for \"precancerous cells\" in 2003. She denies feeling any lumps, skin changes, nipple changes or drainage, unexplained weight loss or bone pain. She had menarche at 4, first of two pregnancies at 27, menopause at 44 and did not take HRT. She reports M with breast ca at 68, MGA with breast ca in 62s. She underwent preop breast MRI demonstrating more extensive disease. She elected a right mastectomy and SLNB and reconstruction Ida Villarreal) 1/12/2017 which had a 2 mm focus of IDC and extensive DCIS. She has completed reconstruction and had a left reduction/symmetry procedure. She is now taking letrozole and tolerating it with some hot flashes and mild joint pains. She had a left 3D dx mammogram 11/2017 which was BIRADS 1.       Past Medical History:   Diagnosis Date    Breast cancer [facility]) 2016 & 2017    right mastectomy with implant reocntruction    Cancer [facility]) 2016    breast    Ill-defined condition     bronchitis hx     Past Surgical History:   Procedure Laterality Date    BREAST SURGERY PROCEDURE UNLISTED      right breast biopsy benign    HX BREAST RECONSTRUCTION Right 1/13/2017    RIGHT BREAST RECONSTRUCTION WITH TISSUE EXPANDER AND ALLODERM performed by [facility]y [facility] MAIN OR    HX BREAST RECONSTRUCTION Right 5/19/2017    EXCHANGE RIGHT TISSUE EXPANDER FOR IMPLANT, LEFT MASTOPEXY performed by Ady Wallace MD at [facility] MA[facility] OR    HX BREAST REDUCTION Left 5/19/2017    BREAST MASTOPEXY performed by Main Marrufo MD at [facility] MAIN OR    [address]X [id number] HX MASTECTOMY Right 1/13/2017    RIGHT BREAST SKIN SPARING MASTECTOMY AND SENTINEL LYMPH NODE BIOPSY/AXILLARY NODE DISSECTION performed by Georgi Boston MD at [facility] MAIN OR    HX ORTHOPAEDIC  5/2/12    open reduction and internal fixation right distal radius     Family History   Problem Relation Age of Onset    Hypertension Mother     Cancer Mother      breast bladder    Breast Cancer Mother 67    Cancer Father      liver     Social History   Substance Use Topics    Smoking status: Never Smoker    Smokeless tobacco: Never Used    Alcohol use 0.6 oz/week     1 Shots of liquor per week      Comment: very rare--once or twice a year     Current Outpatient Prescriptions   Medication Sig    IBANDRONATE SODIUM (BONIVA PO) Take 1 Tab by mouth every thirty (30) days.  letrozole (FEMARA) 2.5 mg tablet Take 2.5 mg by mouth daily. No current facility-administered medications for this visit. No Known Allergies    Review of Systems   Constitutional: Negative for chills, diaphoresis, fever, malaise/fatigue and weight loss. HENT: Negative for congestion, ear pain and sore throat. Eyes: Negative for blurred vision and pain. Respiratory: Negative for cough, hemoptysis, sputum production, shortness of breath, wheezing and stridor. Cardiovascular: Negative for chest pain, palpitations, orthopnea, claudication, leg swelling and PND. Gastrointestinal: Negative for abdominal pain, blood in stool, constipation, diarrhea, heartburn, melena, nausea and vomiting. Genitourinary: Negative for dysuria, flank pain, frequency, hematuria and urgency. Musculoskeletal: Negative for back pain, joint pain, myalgias and neck pain. Skin: Negative for itching and rash.    Neurological: Negative for dizziness, tremors, focal weakness, seizures, weakness and headaches. Endo/Heme/Allergies: Negative for polydipsia. Psychiatric/Behavioral: Negative for depression and memory loss. The patient is not nervous/anxious. Visit Vitals    /69 (BP 1 Location: Left arm, BP Patient Position: Sitting)    Pulse 88    Temp 98.6 °F (37 °C) (Oral)    Resp 16    Ht 5' 5\" (1.651 m)    Wt 87.3 kg (192 lb 8 oz)    SpO2 95%    BMI 32.03 kg/m2       Physical Exam   Constitutional: She is oriented to person, place, and time. She appears well-developed and well-nourished. No distress. HENT:   Head: Normocephalic and atraumatic. Mouth/Throat: Oropharynx is clear and moist. No oropharyngeal exudate. Eyes: Conjunctivae and EOM are normal. Pupils are equal, round, and reactive to light. No scleral icterus. Neck: Normal range of motion. Neck supple. No JVD present. No tracheal deviation present. No thyromegaly present. Cardiovascular: Normal rate and regular rhythm. Exam reveals no gallop and no friction rub. No murmur heard. Pulmonary/Chest: Effort normal and breath sounds normal. No respiratory distress. She has no wheezes. She has no rales. Right breast exhibits tenderness. Right breast exhibits no inverted nipple, no mass, no nipple discharge and no skin change. Left breast exhibits no inverted nipple, no mass, no nipple discharge, no skin change and no tenderness. Breasts are symmetrical (med). Abdominal: Soft. Bowel sounds are normal. She exhibits no distension and no mass. There is no tenderness. There is no rebound and no guarding. Musculoskeletal: Normal range of motion. She exhibits no edema. Lymphadenopathy:     She has no cervical adenopathy. She has no axillary adenopathy. Right: No supraclavicular adenopathy present. Left: No supraclavicular adenopathy present. Neurological: She is alert and oriented to person, place, and time. No cranial nerve deficit. Skin: Skin is warm and dry.  No rash noted. She is not diaphoretic. No erythema. No pallor. Psychiatric: She has a normal mood and affect. Her behavior is normal. Judgment and thought content normal.       ASSESSMENT and PLAN  1. Right breast cancer stage 1 ([id number])  Dx 11/2016:  STACI at 1 year and 9 months  Left mammogram 11/2018  Continue letrozole  2. Colon screening. Hx colon polyps Scheduled for colonoscopy with Dr Quoc Garcia 2/2018.     Repeat 2/2023    RTC 6 months      Michelle Benson MD FACS

## 2018-08-17 ENCOUNTER — TELEPHONE (OUTPATIENT)
Dept: SURGERY | Age: 58
End: 2018-08-17

## 2018-08-17 DIAGNOSIS — C50.411 MALIGNANT NEOPLASM OF UPPER-OUTER QUADRANT OF RIGHT BREAST IN FEMALE, ESTROGEN RECEPTOR POSITIVE (HCC): Primary | ICD-10-CM

## 2018-08-17 DIAGNOSIS — Z17.0 MALIGNANT NEOPLASM OF UPPER-OUTER QUADRANT OF RIGHT BREAST IN FEMALE, ESTROGEN RECEPTOR POSITIVE (HCC): Primary | ICD-10-CM

## 2018-08-17 NOTE — TELEPHONE ENCOUNTER
Bryan Avitia called from scheduling wanting to clarify patient order for mammogram.  Please advise if it should be 3D mammogram Lt breast screening or diagnostic?

## 2018-11-13 ENCOUNTER — HOSPITAL ENCOUNTER (OUTPATIENT)
Dept: MAMMOGRAPHY | Age: 58
Discharge: HOME | End: 2018-11-13
Attending: SURGERY
Payer: COMMERCIAL

## 2018-11-13 DIAGNOSIS — C50.411 MALIGNANT NEOPLASM OF UPPER-OUTER QUADRANT OF RIGHT BREAST IN FEMALE, ESTROGEN RECEPTOR POSITIVE (HCC): ICD-10-CM

## 2018-11-13 DIAGNOSIS — Z17.0 MALIGNANT NEOPLASM OF UPPER-OUTER QUADRANT OF RIGHT BREAST IN FEMALE, ESTROGEN RECEPTOR POSITIVE (HCC): ICD-10-CM

## 2018-11-13 PROCEDURE — 77063 BREAST TOMOSYNTHESIS BI: CPT

## 2018-11-13 PROCEDURE — 77065 DX MAMMO INCL CAD UNI: CPT

## 2019-02-25 ENCOUNTER — OFFICE VISIT (OUTPATIENT)
Dept: SURGERY | Age: 59
End: 2019-02-25

## 2019-02-25 VITALS
BODY MASS INDEX: 32.82 KG/M2 | HEIGHT: 65 IN | DIASTOLIC BLOOD PRESSURE: 89 MMHG | TEMPERATURE: 98.7 F | SYSTOLIC BLOOD PRESSURE: 151 MMHG | HEART RATE: 78 BPM | OXYGEN SATURATION: 97 % | RESPIRATION RATE: 16 BRPM | WEIGHT: 197 LBS

## 2019-02-25 DIAGNOSIS — Z12.31 SCREENING MAMMOGRAM, ENCOUNTER FOR: Primary | ICD-10-CM

## 2019-02-25 DIAGNOSIS — C50.411 MALIGNANT NEOPLASM OF UPPER-OUTER QUADRANT OF RIGHT BREAST IN FEMALE, ESTROGEN RECEPTOR POSITIVE (HCC): ICD-10-CM

## 2019-02-25 DIAGNOSIS — Z17.0 MALIGNANT NEOPLASM OF UPPER-OUTER QUADRANT OF RIGHT BREAST IN FEMALE, ESTROGEN RECEPTOR POSITIVE (HCC): ICD-10-CM

## 2019-02-25 NOTE — PROGRESS NOTES
HISTORY OF PRESENT ILLNESS Felix Granados is a 59 y.o. female who comes in for breast cancer follow up Breast Cancer Pertinent negatives include no chest pain, no abdominal pain, no headaches and no shortness of breath. She went for screening mammogram and was noted to have a right breast calcificaitons at 1200. She had a dx mammogram and US demonstrating a 6 mm lesion but also surrounding microcalcifications 2 cm away. US core of the lesion 11/15/2016 demonstrated IDC G2 % MI 15% her2/susy 0, and Ki67 30%. She previously had had a right breast biopsy for \"precancerous cells\" in 2003. She denies feeling any lumps, skin changes, nipple changes or drainage, unexplained weight loss or bone pain. She had menarche at 4, first of two pregnancies at 27, menopause at 44 and did not take HRT. She reports M with breast ca at 68, MGA with breast ca in 62s. She underwent preop breast MRI demonstrating more extensive disease. 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 She has completed reconstruction and had a left reduction/symmetry procedure. She is now taking letrozole and tolerating it with some hot flashes and mild joint pains. She had a left 3D dx mammogram 11/2018 which was BIRADS 1. Past Medical History:  
Diagnosis Date  Breast cancer [facility]) 2016 & 2017  
 right mastectomy with implant reocntruction  Cancer ([facility] [address] 2016  
 breast  
 Ill-defined condition   
 bronchitis hx Past Surgical History:  
Procedure Laterality Date  BREAST SURGERY PROCEDURE UNLISTED    
 right breast biopsy benign  HX BREAST RECONSTRUCTION Right 1/13/2017 RIGHT BREAST RECONSTRUCTION WITH TISSUE EXPANDER AND ALLODERM performed by Pippa Fuentes MD at [facility] MAIN OR  
 HX BREAST RECONSTRUCTION Right 5/19/2017  EXCHANGE RIGHT TISSUE EXPANDER FOR IMPLANT, LEFT MASTOPEXY performed by Paola Clayton MD at [facility] MAIN OR  
 HX BREAST REDUCTION Left 5/19/2017 BREAST MASTOPEXY performed by Paola Clayton MD at [facility] [address]  HX MASTECTOMY Right 1/13/2017 RIGHT BREAST SKIN SPARING MASTECTOMY AND SENTINEL LYMPH NODE BIOPSY/AXILLARY NODE DISSECTION performed by Agustina Sumner MD at [facility] MAIN OR  
 HX ORTHOPAEDIC  5/2/12  
 open reduction and internal fixation right distal radius Family History Problem Relation Age of Onset  Hypertension Mother  Cancer Mother   
     breast bladder  Breast Cancer Mother 67  Cancer Father   
     liver Social History Tobacco Use  Smoking status: Never Smoker  Smokeless tobacco: Never Used Substance Use Topics  Alcohol use: Yes Alcohol/week: 0.6 oz Types: 1 Shots of liquor per week Comment: very rare--once or twice a year  Drug use: No  
 
Current Outpatient Medications Medication Sig  IBANDRONATE SODIUM (BONIVA PO) Take 1 Tab by mouth every thirty (30) days.  letrozole (FEMARA) 2.5 mg tablet Take 2.5 mg by mouth daily. No current facility-administered medications for this visit. No Known Allergies Review of Systems Constitutional: Negative for chills, diaphoresis, fever, malaise/fatigue and weight loss. HENT: Negative for congestion, ear pain and sore throat. Eyes: Negative for blurred vision and pain. Respiratory: Negative for cough, hemoptysis, sputum production, shortness of breath, wheezing and stridor. Cardiovascular: Negative for chest pain, palpitations, orthopnea, claudication, leg swelling and PND. Gastrointestinal: Negative for abdominal pain, blood in stool, constipation, diarrhea, heartburn, melena, nausea and vomiting. Genitourinary: Negative for dysuria, flank pain, frequency, hematuria and urgency. Musculoskeletal: Negative for back pain, joint pain, myalgias and neck pain. Skin: Negative for itching and rash. Neurological: Negative for dizziness, tremors, focal weakness, seizures, weakness and headaches. Endo/Heme/Allergies: Negative for polydipsia. Psychiatric/Behavioral: Negative for depression and memory loss. The patient is not nervous/anxious. Visit Vitals /89 (BP 1 Location: Left arm, BP Patient Position: Sitting) Pulse 78 Temp 98.7 °F (37.1 °C) (Oral) Resp 16 Ht 5' 5\" (1.651 m) Wt 89.4 kg (197 lb) SpO2 97% BMI 32.78 kg/m² Physical Exam  
Constitutional: She is oriented to person, place, and time. She appears well-developed and well-nourished. No distress. HENT:  
Head: Normocephalic and atraumatic. Mouth/Throat: Oropharynx is clear and moist. No oropharyngeal exudate. Eyes: Conjunctivae and EOM are normal. Pupils are equal, round, and reactive to light. No scleral icterus. Neck: Normal range of motion. Neck supple. No JVD present. No tracheal deviation present. No thyromegaly present. Cardiovascular: Normal rate and regular rhythm. Exam reveals no gallop and no friction rub. No murmur heard. Pulmonary/Chest: Effort normal and breath sounds normal. No respiratory distress. She has no wheezes. She has no rales. Right breast exhibits tenderness. Right breast exhibits no inverted nipple, no mass, no nipple discharge and no skin change. Left breast exhibits no inverted nipple, no mass, no nipple discharge, no skin change and no tenderness. Breasts are symmetrical (med). Abdominal: Soft. Bowel sounds are normal. She exhibits no distension and no mass. There is no tenderness. There is no rebound and no guarding. Musculoskeletal: Normal range of motion. She exhibits no edema. Lymphadenopathy:  
  She has no cervical adenopathy. She has no axillary adenopathy. Right: No supraclavicular adenopathy present. Left: No supraclavicular adenopathy present. Neurological: She is alert and oriented to person, place, and time. No cranial nerve deficit. Skin: Skin is warm and dry. No rash noted. She is not diaphoretic. No erythema. No pallor. Psychiatric: She has a normal mood and affect. Her behavior is normal. Judgment and thought content normal.  
 
 
ASSESSMENT and PLAN 1. Right breast cancer stage 1 ([id number])  Dx 11/2016:  STACI at 1 years and 3 months Left mammogram 11/2019 Continue letrozole 2. Colon screening. Hx colon polyps. last colonoscopy with Dr Mckinley Thomas 2/2018. Repeat 2/2023 RTC 1 year Christoph Gutierrez MD FACS

## 2019-02-25 NOTE — PROGRESS NOTES
Chief Complaint Patient presents with  Breast Cancer 6 month follow up. 1. Have you been to the ER, urgent care clinic since your last visit? Hospitalized since your last visit? No 
 
2. Have you seen or consulted any other health care providers outside of the [address] [place] [address] since your last visit? Include any pap smears or colon screening.  No

## 2019-11-18 ENCOUNTER — HOSPITAL ENCOUNTER (OUTPATIENT)
Dept: MAMMOGRAPHY | Age: 59
Discharge: HOME | End: 2019-11-18
Attending: SURGERY
Payer: COMMERCIAL

## 2019-11-18 DIAGNOSIS — C50.411 MALIGNANT NEOPLASM OF UPPER-OUTER QUADRANT OF RIGHT BREAST IN FEMALE, ESTROGEN RECEPTOR POSITIVE (HCC): ICD-10-CM

## 2019-11-18 DIAGNOSIS — Z12.31 SCREENING MAMMOGRAM, ENCOUNTER FOR: ICD-10-CM

## 2019-11-18 DIAGNOSIS — Z17.0 MALIGNANT NEOPLASM OF UPPER-OUTER QUADRANT OF RIGHT BREAST IN FEMALE, ESTROGEN RECEPTOR POSITIVE (HCC): ICD-10-CM

## 2019-11-18 PROCEDURE — 77063 BREAST TOMOSYNTHESIS BI: CPT

## 2020-02-25 ENCOUNTER — OFFICE VISIT (OUTPATIENT)
Dept: SURGERY | Age: 60
End: 2020-02-25

## 2020-02-25 VITALS
HEIGHT: 65 IN | OXYGEN SATURATION: 97 % | TEMPERATURE: 97.6 F | DIASTOLIC BLOOD PRESSURE: 86 MMHG | BODY MASS INDEX: 32.39 KG/M2 | HEART RATE: 70 BPM | WEIGHT: 194.4 LBS | RESPIRATION RATE: 20 BRPM | SYSTOLIC BLOOD PRESSURE: 156 MMHG

## 2020-02-25 DIAGNOSIS — Z12.31 SCREENING MAMMOGRAM FOR HIGH-RISK PATIENT: ICD-10-CM

## 2020-02-25 DIAGNOSIS — C50.411 MALIGNANT NEOPLASM OF UPPER-OUTER QUADRANT OF RIGHT BREAST IN FEMALE, ESTROGEN RECEPTOR POSITIVE (HCC): Primary | ICD-10-CM

## 2020-02-25 DIAGNOSIS — Z17.0 MALIGNANT NEOPLASM OF UPPER-OUTER QUADRANT OF RIGHT BREAST IN FEMALE, ESTROGEN RECEPTOR POSITIVE (HCC): Primary | ICD-10-CM

## 2020-02-25 RX ORDER — ALENDRONATE SODIUM 70 MG/1
TABLET ORAL
COMMUNITY

## 2020-02-25 NOTE — PROGRESS NOTES
HISTORY OF PRESENT ILLNESS  Yasemin Cheatham is a 62 y.o. female who comes in for breast cancer follow up  Breast Cancer   Pertinent negatives include no chest pain, no abdominal pain, no headaches and no shortness of breath. She went for screening mammogram and was noted to have a right breast calcificaitons at 1200. She had a dx mammogram and US demonstrating a 6 mm lesion but also surrounding microcalcifications 2 cm away. US core of the lesion 11/15/2016 demonstrated IDC G2 % NC 15% her2/susy 0, and Ki67 30%. She previously had had a right breast biopsy for \"precancerous cells\" in 2003. She denies feeling any lumps, skin changes, nipple changes or drainage, unexplained weight loss or bone pain. She had menarche at 4, first of two pregnancies at 27, menopause at 44 and did not take HRT. She reports M with breast ca at 68, MGA with breast ca in 62s. She underwent preop breast MRI demonstrating more extensive disease. She elected a right mastectomy and SLNB and reconstruction Stacy Rondon 1/12/2017 which had a 2 mm focus of IDC and extensive DCIS. She has completed reconstruction and had a left reduction/symmetry procedure. She is now taking letrozole and tolerating it with some hot flashes and mild joint pains. She had a left 3D dx mammogram 11/18/2019 which was BIRADS 1.       Past Medical History:   Diagnosis Date    Breast cancer [facility]) 2016 & 2017    right mastectomy with implant reocntruction    Cancer [facility]) 2016    breast    Ill-defined condition     bronchitis hx     Past Surgical History:   Procedure Laterality Date    BREAST SURGERY PROCEDURE UNLISTED      right breast biopsy benign    HX BREAST RECONSTRUCTION Right 1/13/2017    RIGHT BREAST RECONSTRUCTION WITH TISSUE EXPANDER AND ALLODERM performed by Jaleesa Delaney [facility] MAIN OR    HX BREAST RECONSTRUCTION Right 5/19/2017    EXCHANGE RIGHT TISSUE EXPANDER FOR IMPLANT, LEFT MASTOPEXY performed by Jaleesa Delaney MD at [facility] MAIN OR    HX BREAST REDUCTION Left 5/19/2017    BREAST MASTOPEXY performed by Alaine Goldmann, MD at [facility] MAIN OR    [address]X [id number] HX MASTECTOMY Right 1/13/2017    RIGHT BREAST SKIN SPARING MASTECTOMY AND SENTINEL LYMPH NODE BIOPSY/AXILLARY NODE DISSECTION performed by Rose Smith MD at [facility] MAIN OR    HX ORTHOPAEDIC  5/2/12    open reduction and internal fixation right distal radius     Family History   Problem Relation Age of Onset    Hypertension Mother     Cancer Mother         breast bladder    Breast Cancer Mother 67    Cancer Father         liver     Social History     Tobacco Use    Smoking status: Never Smoker    Smokeless tobacco: Never Used   Substance Use Topics    Alcohol use: Yes     Alcohol/week: 1.0 standard drinks     Types: 1 Shots of liquor per week     Comment: very rare--once or twice a year    Drug use: No     Current Outpatient Medications   Medication Sig    alendronate (FOSAMAX) 70 mg tablet Take  by mouth every seven (7) days.  letrozole (FEMARA) 2.5 mg tablet Take 2.5 mg by mouth daily.  IBANDRONATE SODIUM (BONIVA PO) Take 1 Tab by mouth every thirty (30) days. No current facility-administered medications for this visit. No Known Allergies    Review of Systems   Constitutional: Negative for chills, diaphoresis, fever, malaise/fatigue and weight loss. HENT: Negative for congestion, ear pain and sore throat. Eyes: Negative for blurred vision and pain. Respiratory: Negative for cough, hemoptysis, sputum production, shortness of breath, wheezing and stridor. Cardiovascular: Negative for chest pain, palpitations, orthopnea, claudication, leg swelling and PND. Gastrointestinal: Negative for abdominal pain, blood in stool, constipation, diarrhea, heartburn, melena, nausea and vomiting. Genitourinary: Negative for dysuria, flank pain, frequency, hematuria and urgency.    Musculoskeletal: Negative for back pain, joint pain, myalgias and neck pain. Skin: Negative for itching and rash. Neurological: Negative for dizziness, tremors, focal weakness, seizures, weakness and headaches. Endo/Heme/Allergies: Negative for polydipsia. Psychiatric/Behavioral: Negative for depression and memory loss. The patient is not nervous/anxious. Visit Vitals  /86 (BP 1 Location: Left arm, BP Patient Position: Sitting)   Pulse 70   Temp 97.6 °F (36.4 °C) (Oral)   Resp 20   Ht 5' 5\" (1.651 m)   Wt 88.2 kg (194 lb 6.4 oz)   SpO2 97%   BMI 32.35 kg/m²       Physical Exam  Constitutional:       General: She is not in acute distress. Appearance: She is well-developed. She is not diaphoretic. HENT:      Head: Normocephalic and atraumatic. Mouth/Throat:      Pharynx: No oropharyngeal exudate. Eyes:      General: No scleral icterus. Conjunctiva/sclera: Conjunctivae normal.      Pupils: Pupils are equal, round, and reactive to light. Neck:      Musculoskeletal: Normal range of motion and neck supple. Thyroid: No thyromegaly. Vascular: No JVD. Trachea: No tracheal deviation. Cardiovascular:      Rate and Rhythm: Normal rate and regular rhythm. Heart sounds: No murmur. No friction rub. No gallop. Pulmonary:      Effort: Pulmonary effort is normal. No respiratory distress. Breath sounds: Normal breath sounds. No wheezing or rales. Chest:      Breasts: Breasts are symmetrical (med). Right: Tenderness present. No inverted nipple, mass, nipple discharge or skin change. Left: No inverted nipple, mass, nipple discharge, skin change or tenderness. Abdominal:      General: Bowel sounds are normal. There is no distension. Palpations: Abdomen is soft. There is no mass. Tenderness: There is no abdominal tenderness. There is no guarding or rebound. Musculoskeletal: Normal range of motion. Lymphadenopathy:      Cervical: No cervical adenopathy. Upper Body:      Right upper body: No supraclavicular adenopathy. Left upper body: No supraclavicular adenopathy. Skin:     General: Skin is warm and dry. Coloration: Skin is not pale. Findings: No erythema or rash. Neurological:      Mental Status: She is alert and oriented to person, place, and time. Cranial Nerves: No cranial nerve deficit. Psychiatric:         Behavior: Behavior normal.         Thought Content: Thought content normal.         Judgment: Judgment normal.         ASSESSMENT and PLAN  1. Right breast cancer stage 1 ([id number])  Dx 11/2016:  STACI at 1 years and 3 months  Left mammogram 11/2020  Continue letrozole  2. Colon screening. Hx colon polyps. last colonoscopy with Dr Leonel Ram 2/2018. Repeat 2/2023  3.   Social retired in 2019 and enjoying it  RTC 1 year      Rose Smith MD FACS

## 2020-02-25 NOTE — PROGRESS NOTES
Chief Complaint   Patient presents with    Follow-up     1 year breast exam       1. Have you been to the ER, urgent care clinic since your last visit? no Hospitalized since your last visit?no    2. Have you seen or consulted any other health care providers outside of the [address] [place] [address] since your last visit? yes  Include any pap smears or colon screening. Discussed advanced directive. Patient states that she does  have an advanced directive.

## 2020-11-23 ENCOUNTER — HOSPITAL ENCOUNTER (OUTPATIENT)
Dept: MAMMOGRAPHY | Age: 60
Discharge: HOME | End: 2020-11-23
Attending: SURGERY
Payer: COMMERCIAL

## 2020-11-23 DIAGNOSIS — Z12.31 SCREENING MAMMOGRAM FOR HIGH-RISK PATIENT: ICD-10-CM

## 2020-11-23 PROCEDURE — 77063 BREAST TOMOSYNTHESIS BI: CPT

## 2021-05-07 ENCOUNTER — OFFICE VISIT (OUTPATIENT)
Dept: SURGERY | Age: 61
End: 2021-05-07
Payer: COMMERCIAL

## 2021-05-07 VITALS
RESPIRATION RATE: 16 BRPM | HEIGHT: 65 IN | OXYGEN SATURATION: 99 % | DIASTOLIC BLOOD PRESSURE: 86 MMHG | SYSTOLIC BLOOD PRESSURE: 140 MMHG | BODY MASS INDEX: 31.65 KG/M2 | WEIGHT: 190 LBS | TEMPERATURE: 97.5 F | HEART RATE: 68 BPM

## 2021-05-07 DIAGNOSIS — Z17.0 MALIGNANT NEOPLASM OF UPPER-OUTER QUADRANT OF RIGHT BREAST IN FEMALE, ESTROGEN RECEPTOR POSITIVE (HCC): Primary | ICD-10-CM

## 2021-05-07 DIAGNOSIS — Z12.31 SCREENING MAMMOGRAM FOR HIGH-RISK PATIENT: ICD-10-CM

## 2021-05-07 DIAGNOSIS — C50.411 MALIGNANT NEOPLASM OF UPPER-OUTER QUADRANT OF RIGHT BREAST IN FEMALE, ESTROGEN RECEPTOR POSITIVE (HCC): Primary | ICD-10-CM

## 2021-05-07 PROCEDURE — 99213 OFFICE O/P EST LOW 20 MIN: CPT | Performed by: SURGERY

## 2021-05-07 NOTE — PROGRESS NOTES
HISTORY OF PRESENT ILLNESS  Glen Oakes is a 56 y.o. female who comes in for breast cancer follow up  Breast Cancer  Pertinent negatives include no chest pain, no abdominal pain, no headaches and no shortness of breath. Follow-up  Pertinent negatives include no chest pain, no abdominal pain, no headaches and no shortness of breath. She went for screening mammogram and was noted to have a right breast calcificaitons at 1200. She had a dx mammogram and US demonstrating a 6 mm lesion but also surrounding microcalcifications 2 cm away. US core of the lesion 11/15/2016 demonstrated IDC G2 % [place] 15% her2/susy 0, and Ki67 30%. She previously had had a right breast biopsy for \"precancerous cells\" in 2003. She denies feeling any lumps, skin changes, nipple changes or drainage, unexplained weight loss or bone pain. She had menarche at 4, first of two pregnancies at 27, menopause at 44 and did not take HRT. She reports M with breast ca at 68, MGA with breast ca in 62s. She underwent preop breast MRI demonstrating more extensive disease. She elected a right mastectomy and SLNB and reconstruction Naheed Cap) 1/12/2017 which had a 2 mm focus of IDC and extensive DCIS. She has completed reconstruction and had a left reduction/symmetry procedure. She is now taking letrozole and tolerating it with some hot flashes and mild joint pains. She had a left 3D dx mammogram 11/23/2020 which was BIRADS 1.       Past Medical History:   Diagnosis Date    Breast cancer [facility]) 2016 & 2017    right mastectomy with implant reocntruction    Cancer [facility]) 2016    breast    Ill-defined condition     bronchitis hx     Past Surgical History:   Procedure Laterality Date    HX BREAST RECONSTRUCTION Right 1/13/2017    RIGHT BREAST RECONSTRUCTION WITH TISSUE EXPANDER AND ALLODERM performed by Ehsan Odonnell MD at [facility] MA[facility] OR    HX BREAST RECONSTRUCTION Right 5/19/2017    EXCHANGE RIGHT TISSUE EXPANDER FOR IMPLANT, LEFT MASTOPEXY performed by Jerry Pike MD at [facility] MAIN OR    HX BREAST REDUCTION Left 5/19/2017    BREAST MASTOPEXY performed by Jerry Pike MD at [facility] MAIN OR    HX [address]     [address] HX MASTECTOMY Right 1/13/2017    RIGHT BREAST SKIN SPARING MASTECTOMY AND SENTINEL LYMPH NODE BIOPSY/AXILLARY NODE DISSECTION performed by Kaylan Myles MD at [facility] MAIN OR    HX ORTHOPAEDIC  5/2/12    open reduction and internal fixation right distal radius    ND BREAST SURGERY PROCEDURE UNLISTED      right breast biopsy benign     Family History   Problem Relation Age of Onset    Hypertension Mother     Cancer Mother         breast bladder    Breast Cancer Mother 67    Cancer Father         liver     Social History     Tobacco Use    Smoking status: Never Smoker    Smokeless tobacco: Never Used   Substance Use Topics    Alcohol use: Yes     Alcohol/week: 1.0 standard drinks     Types: 1 Shots of liquor per week     Comment: very rare--once or twice a year    Drug use: No     Current Outpatient Medications   Medication Sig    alendronate (FOSAMAX) 70 mg tablet Take  by mouth every seven (7) days.  letrozole (FEMARA) 2.5 mg tablet Take 2.5 mg by mouth daily.  IBANDRONATE SODIUM (BONIVA PO) Take 1 Tab by mouth every thirty (30) days. No current facility-administered medications for this visit. No Known Allergies    Review of Systems   Constitutional: Negative for chills, diaphoresis, fever, malaise/fatigue and weight loss. HENT: Negative for congestion, ear pain and sore throat. Eyes: Negative for blurred vision and pain. Respiratory: Negative for cough, hemoptysis, sputum production, shortness of breath, wheezing and stridor. Cardiovascular: Negative for chest pain, palpitations, orthopnea, claudication, leg swelling and PND.    Gastrointestinal: Negative for abdominal pain, blood in stool, constipation, diarrhea, heartburn, melena, nausea and vomiting. Genitourinary: Negative for dysuria, flank pain, frequency, hematuria and urgency. Musculoskeletal: Negative for back pain, joint pain, myalgias and neck pain. Skin: Negative for itching and rash. Neurological: Negative for dizziness, tremors, focal weakness, seizures, weakness and headaches. Endo/Heme/Allergies: Negative for polydipsia. Psychiatric/Behavioral: Negative for depression and memory loss. The patient is not nervous/anxious. Visit Vitals  BP (!) 140/86 (BP 1 Location: Left upper arm, BP Patient Position: Sitting, BP Cuff Size: Adult)   Pulse 68   Temp 97.5 °F (36.4 °C) (Temporal)   Resp 16   Ht 5' 5\" (1.651 m)   Wt 86.2 kg (190 lb)   SpO2 99%   BMI 31.62 kg/m²       Physical Exam  Exam conducted with a chaperone present. Constitutional:       General: She is not in acute distress. Appearance: She is well-developed. She is not diaphoretic. HENT:      Head: Normocephalic and atraumatic. Mouth/Throat:      Pharynx: No oropharyngeal exudate. Eyes:      General: No scleral icterus. Conjunctiva/sclera: Conjunctivae normal.      Pupils: Pupils are equal, round, and reactive to light. Neck:      Musculoskeletal: Normal range of motion and neck supple. Thyroid: No thyromegaly. Vascular: No JVD. Trachea: No tracheal deviation. Cardiovascular:      Rate and Rhythm: Normal rate and regular rhythm. Heart sounds: No murmur. No friction rub. No gallop. Pulmonary:      Effort: Pulmonary effort is normal. No respiratory distress. Breath sounds: Normal breath sounds. No wheezing or rales. Chest:      Breasts: Breasts are symmetrical (med). Right: No inverted nipple, mass, nipple discharge, skin change or tenderness. Left: No inverted nipple, mass, nipple discharge, skin change or tenderness. Abdominal:      General: Bowel sounds are normal. There is no distension. Palpations: Abdomen is soft. There is no mass. Tenderness: There is no abdominal tenderness. There is no guarding or rebound. Musculoskeletal: Normal range of motion. Lymphadenopathy:      Cervical: No cervical adenopathy. Upper Body:      Right upper body: No supraclavicular or axillary adenopathy. Left upper body: No supraclavicular or axillary adenopathy. Skin:     General: Skin is warm and dry. Coloration: Skin is not pale. Findings: No erythema or rash. Neurological:      Mental Status: She is alert and oriented to person, place, and time. Cranial Nerves: No cranial nerve deficit. Psychiatric:         Behavior: Behavior normal.         Thought Content: Thought content normal.         Judgment: Judgment normal.         ASSESSMENT and PLAN  1. Right breast cancer stage 1 ([id number])  Dx 11/2016:  STACI at 2 years and 6 months  Left mammogram 11/2021  Continue letrozole  2. Colon screening. Hx colon polyps. last colonoscopy with Dr Georgia Lockhart 2/2018. Repeat 2/2023  3. Social retired in 2019 and enjoying it  4.   Received both Covid vaccinations    RTC 1 year      Beatris Schwarz MD FACS

## 2021-05-07 NOTE — PROGRESS NOTES
Identified pt with two pt identifiers(name and ). Reviewed record in preparation for visit and have obtained necessary documentation. All patient medications has been reviewed. Chief Complaint   Patient presents with    Follow-up     1 year breast check       Health Maintenance Due   Topic    Hepatitis C Screening     COVID-19 Vaccine (1)    DTaP/Tdap/Td series (1 - Tdap)    PAP AKA CERVICAL CYTOLOGY     Shingrix Vaccine Age 50> (1 of 2)    Colorectal Cancer Screening Combo        Vitals:    21 0930   BP: (!) 140/86   Pulse: 68   Resp: 16   Temp: 97.5 °F (36.4 °C)   TempSrc: Temporal   SpO2: 99%   Weight: 86.2 kg (190 lb)   Height: 5' 5\" (1.651 m)   PainSc:   0 - No pain       4. Have you been to the ER, urgent care clinic since your last visit? Hospitalized since your last visit? No    5. Have you seen or consulted any other health care providers outside of the [address] [place] [address] since your last visit? Include any pap smears or colon screening. No      Patient is accompanied by self I have received verbal consent from Catalina Fischer to discuss any/all medical information while they are present in the room.

## 2022-01-10 ENCOUNTER — HOSPITAL ENCOUNTER (OUTPATIENT)
Dept: MAMMOGRAPHY | Age: 62
Discharge: HOME | End: 2022-01-10
Attending: SURGERY
Payer: COMMERCIAL

## 2022-01-10 DIAGNOSIS — Z12.31 SCREENING MAMMOGRAM FOR HIGH-RISK PATIENT: ICD-10-CM

## 2022-01-10 PROCEDURE — 77063 BREAST TOMOSYNTHESIS BI: CPT

## 2022-03-19 PROBLEM — R94.31 ABNORMAL EKG: Status: ACTIVE | Noted: 2017-01-10

## 2022-03-25 ENCOUNTER — TRANSCRIBE ORDER (OUTPATIENT)
Dept: SCHEDULING | Age: 62
End: 2022-03-25

## 2022-03-25 DIAGNOSIS — C50.211 MALIGNANT NEOPLASM OF UPPER-INNER QUADRANT OF RIGHT FEMALE BREAST (HCC): Primary | ICD-10-CM

## 2022-05-18 ENCOUNTER — OFFICE VISIT (OUTPATIENT)
Dept: SURGERY | Age: 62
End: 2022-05-18
Payer: COMMERCIAL

## 2022-05-18 VITALS
HEIGHT: 65 IN | OXYGEN SATURATION: 97 % | WEIGHT: 191 LBS | BODY MASS INDEX: 31.82 KG/M2 | DIASTOLIC BLOOD PRESSURE: 72 MMHG | SYSTOLIC BLOOD PRESSURE: 130 MMHG | RESPIRATION RATE: 16 BRPM | TEMPERATURE: 98.6 F | HEART RATE: 68 BPM

## 2022-05-18 DIAGNOSIS — Z12.31 SCREENING MAMMOGRAM FOR HIGH-RISK PATIENT: ICD-10-CM

## 2022-05-18 DIAGNOSIS — C50.411 MALIGNANT NEOPLASM OF UPPER-OUTER QUADRANT OF RIGHT BREAST IN FEMALE, ESTROGEN RECEPTOR POSITIVE (HCC): Primary | ICD-10-CM

## 2022-05-18 DIAGNOSIS — Z17.0 MALIGNANT NEOPLASM OF UPPER-OUTER QUADRANT OF RIGHT BREAST IN FEMALE, ESTROGEN RECEPTOR POSITIVE (HCC): Primary | ICD-10-CM

## 2022-05-18 PROCEDURE — 99213 OFFICE O/P EST LOW 20 MIN: CPT | Performed by: SURGERY

## 2022-05-18 NOTE — PROGRESS NOTES
HISTORY OF PRESENT ILLNESS  Debi Owen is a 59 y.o. female who comes in for breast cancer follow up  Follow-up  Pertinent negatives include no chest pain, no abdominal pain, no headaches and no shortness of breath. Breast Cancer  Pertinent negatives include no chest pain, no abdominal pain, no headaches and no shortness of breath. She went for screening mammogram and was noted to have a right breast calcificaitons at 1200. She had a dx mammogram and US demonstrating a 6 mm lesion but also surrounding microcalcifications 2 cm away. US core of the lesion 11/15/2016 demonstrated IDC G2 % [place] 15% her2/susy 0, and Ki67 30%. She previously had had a right breast biopsy for \"precancerous cells\" in 2003. She denies feeling any lumps, skin changes, nipple changes or drainage, unexplained weight loss or bone pain. She had menarche at 4, first of two pregnancies at 27, menopause at 44 and did not take HRT. She reports M with breast ca at 68, MGA with breast ca in 62s. She underwent preop breast MRI demonstrating more extensive disease. She elected a right mastectomy and SLNB and reconstruction Forprimitivotomasa Acevedo) 1/12/2017 which had a 2 mm focus of IDC and extensive DCIS. She has completed reconstruction and had a left reduction/symmetry procedure. She is now taking letrozole and tolerating it with some hot flashes and mild joint pains. She had a left 3D dx mammogram 1/10/2022 which was BIRADS 1.       Past Medical History:   Diagnosis Date    Breast cancer [facility]) 2016 & 2017    right mastectomy with implant reocntruction    Cancer [facility]) 2016    breast    Ill-defined condition     bronchitis hx     Past Surgical History:   Procedure Laterality Date    HX BREAST RECONSTRUCTION Right 1/13/2017    RIGHT BREAST RECONSTRUCTION WITH TISSUE EXPANDER AND ALLODERM performed [facility]a Elizabeth [facility]    HX BREAST RECONSTRUCTION Right 5/19/2017    EXCHANGE RIGHT TISSUE EXPANDER FOR IMPLANT, LEFT MASTOPEXY performed by Regine Farias MD at [facility] MAIN OR    HX BREAST REDUCTION Left 5/19/2017    BREAST MASTOPEXY performed by Regine Farias MD at [facility] MAIN OR    [address]    HX [address] [place] HX MASTECTOMY Right 1/13/2017    RIGHT BREAST SKIN SPARING MASTECTOMY AND SENTINEL LYMPH NODE BIOPSY/AXILLARY NODE DISSECTION performed by Lesley Starr MD at [facility] MAIN OR    HX ORTHOPAEDIC  5/2/12    open reduction and internal fixation right distal radius    OH BREAST SURGERY PROCEDURE UNLISTED      right breast biopsy benign     Family History   Problem Relation Age of Onset    Hypertension Mother     Cancer Mother         breast bladder    Breast Cancer Mother 67    Cancer Father         liver     Social History     Tobacco Use    Smoking status: Never Smoker    Smokeless tobacco: Never Used   Vaping Use    Vaping Use: Never used   Substance Use Topics    Alcohol use: Yes     Alcohol/week: 1.0 standard drink     Types: 1 Shots of liquor per week     Comment: very rare--once or twice a year    Drug use: No     Current Outpatient Medications   Medication Sig    alendronate (FOSAMAX) 70 mg tablet Take  by mouth every seven (7) days. (Patient not taking: Reported on 5/18/2022)    IBANDRONATE SODIUM (BONIVA PO) Take 1 Tab by mouth every thirty (30) days.  letrozole (FEMARA) 2.5 mg tablet Take 2.5 mg by mouth daily. No current facility-administered medications for this visit. No Known Allergies    Review of Systems   Constitutional: Negative for chills, diaphoresis, fever, malaise/fatigue and weight loss. HENT: Negative for congestion, ear pain and sore throat. Eyes: Negative for blurred vision and pain. Respiratory: Negative for cough, hemoptysis, sputum production, shortness of breath, wheezing and stridor. Cardiovascular: Negative for chest pain, palpitations, orthopnea, claudication, leg swelling and PND. Gastrointestinal: Negative for abdominal pain, blood in stool, constipation, diarrhea, heartburn, melena, nausea and vomiting. Genitourinary: Negative for dysuria, flank pain, frequency, hematuria and urgency. Musculoskeletal: Negative for back pain, joint pain, myalgias and neck pain. Skin: Negative for itching and rash. Neurological: Negative for dizziness, tremors, focal weakness, seizures, weakness and headaches. Endo/Heme/Allergies: Negative for polydipsia. Psychiatric/Behavioral: Negative for depression and memory loss. The patient is not nervous/anxious. Visit Vitals  /72 (BP 1 Location: Left upper arm, BP Patient Position: Sitting, BP Cuff Size: Adult)   Pulse 68   Temp 98.6 °F (37 °C)   Resp 16   Ht 5' 5\" (1.651 m)   Wt 86.6 kg (191 lb)   SpO2 97%   BMI 31.78 kg/m²       Physical Exam  Exam conducted with a chaperone present. Constitutional:       General: She is not in acute distress. Appearance: She is well-developed. She is not diaphoretic. HENT:      Head: Normocephalic and atraumatic. Mouth/Throat:      Pharynx: No oropharyngeal exudate. Eyes:      General: No scleral icterus. Conjunctiva/sclera: Conjunctivae normal.      Pupils: Pupils are equal, round, and reactive to light. Neck:      Thyroid: No thyromegaly. Vascular: No JVD. Trachea: No tracheal deviation. Cardiovascular:      Rate and Rhythm: Normal rate and regular rhythm. Heart sounds: No murmur heard. No friction rub. No gallop. Pulmonary:      Effort: Pulmonary effort is normal. No respiratory distress. Breath sounds: Normal breath sounds. No wheezing or rales. Chest:   Breasts: Breasts are symmetrical (med). Right: No inverted nipple, mass, nipple discharge, skin change, tenderness, axillary adenopathy or supraclavicular adenopathy. Left: No inverted nipple, mass, nipple discharge, skin change, tenderness, axillary adenopathy or supraclavicular adenopathy. Abdominal:      General: Bowel sounds are normal. There is no distension. Palpations: Abdomen is soft. There is no mass. Tenderness: There is no abdominal tenderness. There is no guarding or rebound. Musculoskeletal:         General: Normal range of motion. Cervical back: Normal range of motion and neck supple. Lymphadenopathy:      Cervical: No cervical adenopathy. Upper Body:      Right upper body: No supraclavicular or axillary adenopathy. Left upper body: No supraclavicular or axillary adenopathy. Skin:     General: Skin is warm and dry. Coloration: Skin is not pale. Findings: No erythema or rash. Neurological:      Mental Status: She is alert and oriented to person, place, and time. Cranial Nerves: No cranial nerve deficit. Psychiatric:         Behavior: Behavior normal.         Thought Content: Thought content normal.         Judgment: Judgment normal.         ASSESSMENT and PLAN  1. Right breast cancer stage 1 ([id number])  Dx 11/2016:  STACI at 3 years and 6 months  Left mammogram 1/2023    Completed 5 years of letrozole (Paco Fernandez)  2. Colon screening. Hx colon polyps. last colonoscopy with Dr Austin 2/2018. Repeat 2/2023  3. Social retired in 2019 and enjoying it  4.   Received both Covid vaccinations and booster and planning second booster 6/2022    RTC 1 year      Tony Baez MD FACS

## 2022-05-18 NOTE — PROGRESS NOTES
Identified pt with two pt identifiers(name and ). Reviewed record in preparation for visit and have obtained necessary documentation. All patient medications has been reviewed. Chief Complaint   Patient presents with    Follow-up     1 year follow up for Malignant neoplasm of upper-outer quadrant of right breast in female, estrogen receptor positive        Health Maintenance Due   Topic    Hepatitis C Screening     Depression Screen     COVID-19 Vaccine (1)    DTaP/Tdap/Td series (1 - Tdap)    Colorectal Cancer Screening Combo     Shingrix Vaccine Age 50> (1 of 2)    Cervical cancer screen     Lipid Screen        Vitals:    22 1430   Height: 5' 5\" (1.651 m)       4. Have you been to the ER, urgent care clinic since your last visit? Hospitalized since your last visit? No    5. Have you seen or consulted any other health care providers outside of the [address] [place] [address] since your last visit? Include any pap smears or colon screening. No      Patient is accompanied by self I have received verbal consent from Catalina Fischer to discuss any/all medical information while they are present in the room.

## 2022-05-24 ENCOUNTER — HOSPITAL ENCOUNTER (OUTPATIENT)
Dept: MAMMOGRAPHY | Age: 62
Discharge: HOME | End: 2022-05-24
Attending: INTERNAL MEDICINE
Payer: COMMERCIAL

## 2022-05-24 DIAGNOSIS — C50.211 MALIGNANT NEOPLASM OF UPPER-INNER QUADRANT OF RIGHT FEMALE BREAST (HCC): ICD-10-CM

## 2022-05-24 PROCEDURE — 77080 DXA BONE DENSITY AXIAL: CPT

## 2023-01-12 ENCOUNTER — HOSPITAL ENCOUNTER (OUTPATIENT)
Dept: MAMMOGRAPHY | Age: 63
Discharge: HOME | End: 2023-01-12
Attending: SURGERY
Payer: COMMERCIAL

## 2023-01-12 DIAGNOSIS — Z12.31 SCREENING MAMMOGRAM FOR HIGH-RISK PATIENT: ICD-10-CM

## 2023-01-12 PROCEDURE — 77063 BREAST TOMOSYNTHESIS BI: CPT

## 2023-05-17 ENCOUNTER — OFFICE VISIT (OUTPATIENT)
Age: 63
End: 2023-05-17
Payer: COMMERCIAL

## 2023-05-17 VITALS
WEIGHT: 192.8 LBS | HEIGHT: 65 IN | TEMPERATURE: 97.8 F | HEART RATE: 100 BPM | RESPIRATION RATE: 20 BRPM | SYSTOLIC BLOOD PRESSURE: 145 MMHG | BODY MASS INDEX: 32.12 KG/M2 | DIASTOLIC BLOOD PRESSURE: 76 MMHG | OXYGEN SATURATION: 95 %

## 2023-05-17 DIAGNOSIS — Z12.31 SCREENING MAMMOGRAM FOR HIGH-RISK PATIENT: ICD-10-CM

## 2023-05-17 DIAGNOSIS — Z17.0 MALIGNANT NEOPLASM OF UPPER-OUTER QUADRANT OF RIGHT BREAST IN FEMALE, ESTROGEN RECEPTOR POSITIVE (HCC): Primary | ICD-10-CM

## 2023-05-17 DIAGNOSIS — C50.411 MALIGNANT NEOPLASM OF UPPER-OUTER QUADRANT OF RIGHT BREAST IN FEMALE, ESTROGEN RECEPTOR POSITIVE (HCC): Primary | ICD-10-CM

## 2023-05-17 PROCEDURE — 99213 OFFICE O/P EST LOW 20 MIN: CPT | Performed by: SURGERY

## 2023-05-17 ASSESSMENT — ENCOUNTER SYMPTOMS
COUGH: 0
NAUSEA: 0
ABDOMINAL PAIN: 0
EYE PAIN: 0
SORE THROAT: 0
SHORTNESS OF BREATH: 0
DIARRHEA: 0
BACK PAIN: 0
VOMITING: 0
BLOOD IN STOOL: 0
STRIDOR: 0
CONSTIPATION: 0
WHEEZING: 0

## 2023-05-17 NOTE — PROGRESS NOTES
Identified pt with two pt identifiers(name and ). Reviewed record in preparation for visit and have obtained necessary documentation. All patient medications has been reviewed. Chief Complaint   Patient presents with    Follow-up     Malignant neoplasm of upper-outer quadrant of right female breast        Health Maintenance Due   Topic    COVID-19 Vaccine (1)    Depression Screen     HIV screen     Hepatitis C screen     DTaP/Tdap/Td vaccine (1 - Tdap)    Diabetes screen     Lipids     Colorectal Cancer Screen     Shingles vaccine (1 of 2)    Cervical cancer screen        Vitals:    23 1406 23 1424   BP: (!) 162/80 (!) 145/76   Site: Left Upper Arm Left Upper Arm   Position: Sitting Sitting   Pulse: 100    Resp: 20    Temp: 97.8 °F (36.6 °C)    TempSrc: Temporal    SpO2: 95%    Weight: 192 lb 12.8 oz (87.5 kg)    Height: 5' 5\" (1.651 m)          4. Have you been to the ER, urgent care clinic since your last visit? Hospitalized since your last visit? No      5. Have you seen or consulted any other health care providers outside of the [address] [place] [address] since your last visit? Include any pap smears or colon screening. No      Patient is accompanied by self I have received verbal consent from Mark Miller to discuss any/all medical information while they are present in the room. Patient and provider made aware of elevated BP x2. Patient asymptomatic. Patient reminded to monitor BP, continue to take BP medications if prescribed, and follow up with PCP/Cardiologist.  Patient expressed understanding and agreement.

## 2023-05-17 NOTE — PROGRESS NOTES
Subjective:      Patient ID: Olamide Bliss is a 57 y.o. female comes in for breast cancer follow up      Chief Complaint   Patient presents with    Follow-up     Malignant neoplasm of upper-outer quadrant of right female breast        HPI  She went for screening mammogram and was noted to have a right breast calcificaitons at 1200. She had a dx mammogram and US demonstrating a 6 mm lesion but also surrounding microcalcifications 2 cm away. US core of the lesion 11/15/2016 demonstrated  IDC G2 % [place] 15% her2/ephraim 0, and Ki67 30%. She previously had had a right breast biopsy for \"precancerous cells\" in 2003. She denies feeling any lumps, skin changes, nipple changes or drainage, unexplained weight loss or bone pain. She had menarche  at 4, first of two pregnancies at 27, menopause at 44 and did not take HRT. She reports M with breast ca at 68, MGA with breast ca in 62s. She underwent preop breast MRI demonstrating more extensive disease. She elected a right mastectomy and SLNB  and reconstruction Aniket Anna) 1/12/2017 which had a 2 mm focus of IDC and extensive DCIS. She has completed reconstruction and had a left reduction/symmetry procedure. She is now taking letrozole and tolerating it with some hot flashes and mild joint  pains. She had a left 3D dx mammogram 1/12/2023 which was BIRADS 1.       Past Medical History:   Diagnosis Date    Breast cancer [facility]) 2016 & 2017    right mastectomy with implant reocntruction    Cancer ([facility] [address] 2016    breast    Ill-defined condition     bronchitis hx     Past Surgical History:   Procedure Laterality Date    BREAST RECONSTRUCTION Right 5/19/2017    EXCHANGE RIGHT TISSUE EXPANDER FOR IMPLANT, LEFT MASTOPEXY performed by Stefanie Johnson MD at [address] Right 1/13/2017    RIGHT BREAST RECONSTRUCTION WITH TISSUE EXPANDER AND ALLODERM performed by Stefanie Johnson MD at [facility] MAIN OR    BREAST REDUCTION SURGERY Left 5/19/2017    BREAST

## 2024-01-16 ENCOUNTER — HOSPITAL ENCOUNTER (OUTPATIENT)
Facility: HOSPITAL | Age: 64
Discharge: HOME | End: 2024-01-19
Payer: COMMERCIAL

## 2024-01-16 VITALS — HEIGHT: 65 IN | WEIGHT: 192.9 LBS | BODY MASS INDEX: 32.14 KG/M2

## 2024-01-16 DIAGNOSIS — Z12.31 SCREENING MAMMOGRAM FOR HIGH-RISK PATIENT: ICD-10-CM

## 2024-01-16 PROCEDURE — 77063 BREAST TOMOSYNTHESIS BI: CPT

## 2024-06-03 ENCOUNTER — OFFICE VISIT (OUTPATIENT)
Age: 64
End: 2024-06-03
Payer: MEDICARE

## 2024-06-03 VITALS
HEIGHT: 65 IN | RESPIRATION RATE: 16 BRPM | BODY MASS INDEX: 31.79 KG/M2 | WEIGHT: 190.8 LBS | SYSTOLIC BLOOD PRESSURE: 136 MMHG | HEART RATE: 69 BPM | OXYGEN SATURATION: 96 % | TEMPERATURE: 97.5 F | DIASTOLIC BLOOD PRESSURE: 88 MMHG

## 2024-06-03 DIAGNOSIS — Z17.0 MALIGNANT NEOPLASM OF UPPER-OUTER QUADRANT OF RIGHT BREAST IN FEMALE, ESTROGEN RECEPTOR POSITIVE (HCC): ICD-10-CM

## 2024-06-03 DIAGNOSIS — Z85.3 HX: BREAST CANCER: Primary | ICD-10-CM

## 2024-06-03 DIAGNOSIS — C50.411 MALIGNANT NEOPLASM OF UPPER-OUTER QUADRANT OF RIGHT BREAST IN FEMALE, ESTROGEN RECEPTOR POSITIVE (HCC): ICD-10-CM

## 2024-06-03 DIAGNOSIS — Z12.31 SCREENING MAMMOGRAM FOR HIGH-RISK PATIENT: ICD-10-CM

## 2024-06-03 PROCEDURE — 1123F ACP DISCUSS/DSCN MKR DOCD: CPT | Performed by: SURGERY

## 2024-06-03 PROCEDURE — 3017F COLORECTAL CA SCREEN DOC REV: CPT | Performed by: SURGERY

## 2024-06-03 PROCEDURE — G8427 DOCREV CUR MEDS BY ELIG CLIN: HCPCS | Performed by: SURGERY

## 2024-06-03 PROCEDURE — G8417 CALC BMI ABV UP PARAM F/U: HCPCS | Performed by: SURGERY

## 2024-06-03 PROCEDURE — 1090F PRES/ABSN URINE INCON ASSESS: CPT | Performed by: SURGERY

## 2024-06-03 PROCEDURE — G8399 PT W/DXA RESULTS DOCUMENT: HCPCS | Performed by: SURGERY

## 2024-06-03 PROCEDURE — 1036F TOBACCO NON-USER: CPT | Performed by: SURGERY

## 2024-06-03 PROCEDURE — 99213 OFFICE O/P EST LOW 20 MIN: CPT | Performed by: SURGERY

## 2024-06-03 ASSESSMENT — ENCOUNTER SYMPTOMS
SHORTNESS OF BREATH: 0
EYE PAIN: 0
NAUSEA: 0
DIARRHEA: 0
SORE THROAT: 0
CONSTIPATION: 0
VOMITING: 0
BACK PAIN: 0
COUGH: 0
STRIDOR: 0
ABDOMINAL PAIN: 0
BLOOD IN STOOL: 0
WHEEZING: 0

## 2024-06-03 ASSESSMENT — PATIENT HEALTH QUESTIONNAIRE - PHQ9
SUM OF ALL RESPONSES TO PHQ QUESTIONS 1-9: 0
1. LITTLE INTEREST OR PLEASURE IN DOING THINGS: NOT AT ALL
SUM OF ALL RESPONSES TO PHQ QUESTIONS 1-9: 0
2. FEELING DOWN, DEPRESSED OR HOPELESS: NOT AT ALL
SUM OF ALL RESPONSES TO PHQ9 QUESTIONS 1 & 2: 0

## 2024-06-03 NOTE — PROGRESS NOTES
Subjective:      Patient ID: Jazmín Szymanski is a 63 y.o. female comes in for breast cancer follow up      Chief Complaint   Patient presents with    Follow-up     Follow up for right breast cancer.        HPI  She went for screening mammogram and was noted to have a right breast calcificaitons at 1200.  She had a dx mammogram and US demonstrating a 6 mm lesion but also surrounding microcalcifications 2 cm away.   US core of the lesion 11/15/2016 demonstrated  IDC G2 % [place] 15% her2/ephraim 0, and Ki67 30%.  She previously had had a right breast biopsy for \"precancerous cells\" in 2003.   She denies feeling any lumps, skin changes, nipple changes or drainage, unexplained weight loss or bone pain.   She had menarche  at 9, first of two pregnancies at 32, menopause at 50 and did not take HRT.  She reports M with breast ca at 68, MGA with breast ca in 60s.    She underwent preop breast MRI demonstrating more extensive disease.  She elected a right mastectomy and SLNB  and reconstruction (Sher) 1/12/2017 which had a 2 mm focus of IDC and extensive DCIS.   She has completed reconstruction and had a left reduction/symmetry procedure.  She is now taking letrozole and tolerating it with some hot flashes and mild joint  pains.   She had a left 3D dx mammogram 1/16/2024 which was BIRADS 1.      Past Medical History:   Diagnosis Date    Breast cancer ([facility]) 2016 & 2017    right mastectomy with implant reocntruction    Cancer ([facility]) 2016    breast    Ill-defined condition     bronchitis hx     Past Surgical History:   Procedure Laterality Date    BREAST RECONSTRUCTION Right 5/19/2017    EXCHANGE RIGHT TISSUE EXPANDER FOR IMPLANT, LEFT MASTOPEXY performed by Austin Olivarez MD at [facility] MAIN OR    BREAST RECONSTRUCTION Right 1/13/2017    RIGHT BREAST RECONSTRUCTION WITH TISSUE EXPANDER AND ALLODERM performed by Austin Olivarez MD at [facility] MAIN OR    BREAST REDUCTION SURGERY Left 5/19/2017    BREAST MASTOPEXY performed by Austin MILLER

## 2024-06-03 NOTE — PROGRESS NOTES
Identified pt with two pt identifiers (name and ). Reviewed chart in preparation for visit and have obtained necessary documentation.    Jazmín Szymanski is a 63 y.o. female  Chief Complaint   Patient presents with    Follow-up     Follow up for right breast cancer.      /88 (Site: Left Upper Arm, Position: Sitting, Cuff Size: Large Adult)   Pulse 69   Temp 97.5 °F (36.4 °C) (Oral)   Resp 16   Ht 1.66 m (5' 5.35\")   Wt 86.5 kg (190 lb 12.8 oz)   SpO2 96%   BMI 31.41 kg/m²     1. Have you been to the ER, urgent care clinic since your last visit?  Hospitalized since your last visit?no    2. Have you seen or consulted any other health care providers outside of the [facility] since your last visit?  Include any pap smears or colon screening. no

## 2025-01-21 ENCOUNTER — HOSPITAL ENCOUNTER (OUTPATIENT)
Facility: HOSPITAL | Age: 65
Discharge: HOME | End: 2025-01-24
Payer: MEDICARE

## 2025-01-21 DIAGNOSIS — Z12.31 ENCOUNTER FOR SCREENING MAMMOGRAM FOR HIGH-RISK PATIENT: ICD-10-CM

## 2025-01-21 PROCEDURE — 77063 BREAST TOMOSYNTHESIS BI: CPT

## 2025-06-06 ENCOUNTER — OFFICE VISIT (OUTPATIENT)
Age: 65
End: 2025-06-06

## 2025-06-06 VITALS
SYSTOLIC BLOOD PRESSURE: 129 MMHG | HEIGHT: 65 IN | OXYGEN SATURATION: 96 % | TEMPERATURE: 97.9 F | BODY MASS INDEX: 31.39 KG/M2 | WEIGHT: 188.4 LBS | HEART RATE: 67 BPM | DIASTOLIC BLOOD PRESSURE: 78 MMHG | RESPIRATION RATE: 12 BRPM

## 2025-06-06 DIAGNOSIS — Z85.3 HX: BREAST CANCER: Primary | ICD-10-CM

## 2025-06-06 DIAGNOSIS — Z12.31 SCREENING MAMMOGRAM FOR BREAST CANCER: ICD-10-CM

## 2025-06-06 RX ORDER — CLOBETASOL PROPIONATE 0.5 MG/ML
1 SOLUTION TOPICAL
COMMUNITY
Start: 2025-04-22

## 2025-06-06 ASSESSMENT — ENCOUNTER SYMPTOMS
SHORTNESS OF BREATH: 0
SORE THROAT: 0
ABDOMINAL PAIN: 0
BLOOD IN STOOL: 0
VOMITING: 0
BACK PAIN: 0
WHEEZING: 0
COUGH: 0
STRIDOR: 0
NAUSEA: 0
DIARRHEA: 0
EYE PAIN: 0
CONSTIPATION: 0

## 2025-06-06 ASSESSMENT — PATIENT HEALTH QUESTIONNAIRE - PHQ9
SUM OF ALL RESPONSES TO PHQ QUESTIONS 1-9: 0
1. LITTLE INTEREST OR PLEASURE IN DOING THINGS: NOT AT ALL
2. FEELING DOWN, DEPRESSED OR HOPELESS: NOT AT ALL
SUM OF ALL RESPONSES TO PHQ QUESTIONS 1-9: 0

## 2025-06-06 NOTE — PROGRESS NOTES
Identified pt with two pt identifiers (name and ). Reviewed chart in preparation for visit and have obtained necessary documentation.    Jazmín Szymanski is a 64 y.o. female Follow-up (Rt breast cancer)  .    Vitals:    25 0801   BP: 129/78   BP Site: Left Upper Arm   Patient Position: Sitting   Pulse: 67   Resp: 12   Temp: 97.9 °F (36.6 °C)   TempSrc: Oral   SpO2: 96%   Weight: 85.5 kg (188 lb 6.4 oz)   Height: 1.66 m (5' 5.35\")          1. Have you been to the ER, urgent care clinic since your last visit?  Hospitalized since your last visit?  no     2. Have you seen or consulted any other health care providers outside of the [facility] since your last visit?  Include any pap smears or colon screening.  no   
  Psychiatric/Behavioral:  Negative for behavioral problems. The patient is not nervous/anxious.          /78 (BP Site: Left Upper Arm, Patient Position: Sitting)   Pulse 67   Temp 97.9 °F (36.6 °C) (Oral)   Resp 12   Ht 1.66 m (5' 5.35\")   Wt 85.5 kg (188 lb 6.4 oz)   SpO2 96%   BMI 31.02 kg/m²     Objective:   Physical Exam  Vitals and nursing note reviewed. Exam conducted with a chaperone present.   Constitutional:       General: She is not in acute distress.     Appearance: Normal appearance. She is well-developed. She is not ill-appearing or diaphoretic.   HENT:      Head: Normocephalic and atraumatic.   Eyes:      General: No scleral icterus.     Extraocular Movements: Extraocular movements intact.      Conjunctiva/sclera: Conjunctivae normal.      Pupils: Pupils are equal, round, and reactive to light.   Neck:      Thyroid: No thyroid mass or thyromegaly.      Trachea: Trachea and phonation normal. No tracheal deviation.   Cardiovascular:      Rate and Rhythm: Normal rate and regular rhythm.      Heart sounds: Normal heart sounds. No murmur heard.     No friction rub. No gallop.   Pulmonary:      Effort: Pulmonary effort is normal. No respiratory distress.      Breath sounds: Normal breath sounds. No stridor. No wheezing or rales.   Chest:   Breasts:     Breasts are symmetrical (right expander in place).      Right: Absent. No inverted nipple, mass, nipple discharge, skin change or tenderness.      Left: No inverted nipple, mass, nipple discharge, skin change or tenderness.       Abdominal:      General: Bowel sounds are normal. There is no distension.      Palpations: There is no mass.      Tenderness: There is no abdominal tenderness. There is no guarding or rebound.      Hernia: No hernia is present.   Musculoskeletal:         General: No swelling or tenderness. Normal range of motion.      Cervical back: Normal range of motion and neck supple.   Lymphadenopathy:      Cervical: No cervical

## (undated) DEVICE — SURGICAL PROCEDURE PACK BASIN MAJ SET CUST NO CAUT

## (undated) DEVICE — (D)SYR 10ML 1/5ML GRAD NSAF -- PKGING CHANGE USE ITEM 338027

## (undated) DEVICE — ABDOMINAL PAD: Brand: DERMACEA

## (undated) DEVICE — DRAPE,CHEST,FENES,15X10,STERIL: Brand: MEDLINE

## (undated) DEVICE — DERMABOND SKIN ADH 0.7ML -- DERMABOND ADVANCED 12/BX

## (undated) DEVICE — 3M(TM) MEDIPORE(TM) H SOFT CLOTH TAPE 2866: Brand: 3M™ MEDIPORE™

## (undated) DEVICE — TRAY PREP DRY W/ PREM GLV 2 APPL 6 SPNG 2 UNDPD 1 OVERWRAP

## (undated) DEVICE — SLIM BODY SKIN STAPLER: Brand: APPOSE ULC

## (undated) DEVICE — HANDLE LT SNAP ON ULT DURABLE LENS FOR TRUMPF ALC DISPOSABLE

## (undated) DEVICE — DRAPE,REIN 53X77,STERILE: Brand: MEDLINE

## (undated) DEVICE — NEEDLE HYPO 22GA L1.5IN BLK POLYPR HUB S STL REG BVL STR

## (undated) DEVICE — DEVON™ KNEE AND BODY STRAP 60" X 3" (1.5 M X 7.6 CM): Brand: DEVON

## (undated) DEVICE — SUTURE VCRL SZ 3-0 L27IN ABSRB UD L19MM PS-2 3/8 CIR PRIM J427H

## (undated) DEVICE — ROCKER SWITCH PENCIL BLADE ELECTRODE, HOLSTER: Brand: EDGE

## (undated) DEVICE — BLADE ELECTRODE: Brand: EDGE

## (undated) DEVICE — DRESSING PETRO GZ XRFRM CURAD ST OVERWRAP 5 X 9 IN

## (undated) DEVICE — KENDALL SCD EXPRESS SLEEVES, KNEE LENGTH, MEDIUM: Brand: KENDALL SCD

## (undated) DEVICE — DBD-PACK,LAPAROTOMY,2 REINFORCED GOWNS: Brand: MEDLINE

## (undated) DEVICE — NEEDLE HYPO 18GA L1.5IN PNK S STL HUB POLYPR SHLD REG BVL

## (undated) DEVICE — SUTURE MCRYL SZ 4-0 L27IN ABSRB UD L19MM PS-2 1/2 CIR PRIM Y426H

## (undated) DEVICE — STERILE POLYISOPRENE POWDER-FREE SURGICAL GLOVES: Brand: PROTEXIS

## (undated) DEVICE — STAPLER SKIN 35CT WD STRL DISP -- MULTIFIRE PREMIUM

## (undated) DEVICE — Z CONVERTED USE 2271043 CONTAINER SPEC COLL 4OZ SCR ON LID PEEL PCH

## (undated) DEVICE — Device

## (undated) DEVICE — STAPLER SKIN SQ 30 ABSRB STPL DISP INSORB

## (undated) DEVICE — SYR 3ML LL TIP 1/10ML GRAD --

## (undated) DEVICE — SYSTEM IMPL DEL FOR BRST IMPL FUN (SEE COMMENT)

## (undated) DEVICE — SUTURE PDS II SZ 2-0 L27IN ABSRB VLT CP-1 L36MM 1/2 CIR REV Z466H

## (undated) DEVICE — SUTURE MCRYL SZ 3-0 L27IN ABSRB UD L19MM PS-2 3/8 CIR PRIM Y427H

## (undated) DEVICE — REM POLYHESIVE ADULT PATIENT RETURN ELECTRODE: Brand: VALLEYLAB

## (undated) DEVICE — (D)PREP SKN CHLRAPRP APPL 26ML -- CONVERT TO ITEM 371833

## (undated) DEVICE — GAUZE SPONGES,12 PLY: Brand: CURITY

## (undated) DEVICE — SYR 50ML LR LCK 1ML GRAD NSAF --

## (undated) DEVICE — 1200 GUARD II KIT W/5MM TUBE W/O VAC TUBE: Brand: GUARDIAN

## (undated) DEVICE — SUTURE VCRL SZ 3-0 L18IN ABSRB UD PS-2 L19MM 3/8 CRV PRIM J497H

## (undated) DEVICE — INFECTION CONTROL KIT SYS

## (undated) DEVICE — KERLIX BANDAGE ROLL: Brand: KERLIX

## (undated) DEVICE — TOWEL SURG W17XL27IN STD BLU COT NONFENESTRATED PREWASHED

## (undated) DEVICE — KIT TISS EXP W/ 60ML SYR 122CM TRNSF SET PIERCING DEV L25CM

## (undated) DEVICE — COVER,TABLE,60X90,STERILE: Brand: MEDLINE

## (undated) DEVICE — SPONGE LAP 18X18IN STRL -- 5/PK

## (undated) DEVICE — TELFA ADHESIVE ISLAND DRESSING: Brand: TELFA